# Patient Record
Sex: FEMALE | Race: WHITE | Employment: PART TIME | URBAN - METROPOLITAN AREA
[De-identification: names, ages, dates, MRNs, and addresses within clinical notes are randomized per-mention and may not be internally consistent; named-entity substitution may affect disease eponyms.]

---

## 2017-11-08 ENCOUNTER — HOSPITAL ENCOUNTER (EMERGENCY)
Age: 47
Discharge: HOME | End: 2017-11-08
Payer: COMMERCIAL

## 2017-11-08 VITALS
TEMPERATURE: 98 F | HEIGHT: 64 IN | WEIGHT: 168 LBS | BODY MASS INDEX: 28.68 KG/M2 | OXYGEN SATURATION: 100 % | DIASTOLIC BLOOD PRESSURE: 68 MMHG | HEART RATE: 72 BPM | RESPIRATION RATE: 18 BRPM | SYSTOLIC BLOOD PRESSURE: 132 MMHG

## 2017-11-08 DIAGNOSIS — S16.1XXA STRAIN OF NECK MUSCLE, INITIAL ENCOUNTER: Primary | ICD-10-CM

## 2017-11-08 PROCEDURE — 96372 THER/PROPH/DIAG INJ SC/IM: CPT

## 2017-11-08 PROCEDURE — 6360000002 HC RX W HCPCS: Performed by: NURSE PRACTITIONER

## 2017-11-08 PROCEDURE — 96374 THER/PROPH/DIAG INJ IV PUSH: CPT

## 2017-11-08 PROCEDURE — 99283 EMERGENCY DEPT VISIT LOW MDM: CPT

## 2017-11-08 RX ORDER — IBUPROFEN 600 MG/1
600 TABLET ORAL EVERY 8 HOURS PRN
Qty: 20 TABLET | Refills: 0 | Status: SHIPPED | OUTPATIENT
Start: 2017-11-08 | End: 2017-11-08

## 2017-11-08 RX ORDER — IBUPROFEN 800 MG/1
800 TABLET ORAL EVERY 6 HOURS PRN
COMMUNITY
End: 2019-08-04 | Stop reason: SDUPTHER

## 2017-11-08 RX ORDER — CYCLOBENZAPRINE HCL 10 MG
10 TABLET ORAL 3 TIMES DAILY PRN
Qty: 15 TABLET | Refills: 0 | Status: SHIPPED | OUTPATIENT
Start: 2017-11-08 | End: 2017-11-13

## 2017-11-08 RX ORDER — KETOROLAC TROMETHAMINE 30 MG/ML
30 INJECTION, SOLUTION INTRAMUSCULAR; INTRAVENOUS ONCE
Status: COMPLETED | OUTPATIENT
Start: 2017-11-08 | End: 2017-11-08

## 2017-11-08 RX ORDER — ORPHENADRINE CITRATE 30 MG/ML
60 INJECTION INTRAMUSCULAR; INTRAVENOUS ONCE
Status: COMPLETED | OUTPATIENT
Start: 2017-11-08 | End: 2017-11-08

## 2017-11-08 RX ORDER — METHOCARBAMOL 750 MG/1
750 TABLET, FILM COATED ORAL 3 TIMES DAILY PRN
Qty: 30 TABLET | Refills: 0 | Status: SHIPPED | OUTPATIENT
Start: 2017-11-08 | End: 2017-11-08

## 2017-11-08 RX ORDER — IBUPROFEN 600 MG/1
600 TABLET ORAL EVERY 8 HOURS PRN
Qty: 20 TABLET | Refills: 0 | Status: SHIPPED | OUTPATIENT
Start: 2017-11-08 | End: 2019-08-04

## 2017-11-08 RX ADMIN — ORPHENADRINE CITRATE 60 MG: 30 INJECTION INTRAMUSCULAR; INTRAVENOUS at 19:48

## 2017-11-08 RX ADMIN — KETOROLAC TROMETHAMINE 30 MG: 30 INJECTION, SOLUTION INTRAMUSCULAR at 19:48

## 2017-11-08 ASSESSMENT — PAIN SCALES - GENERAL
PAINLEVEL_OUTOF10: 10
PAINLEVEL_OUTOF10: 10

## 2017-11-08 ASSESSMENT — PAIN DESCRIPTION - PAIN TYPE: TYPE: ACUTE PAIN

## 2017-11-08 ASSESSMENT — PAIN DESCRIPTION - DESCRIPTORS: DESCRIPTORS: STABBING;SHARP;ACHING;THROBBING

## 2017-11-08 ASSESSMENT — PAIN DESCRIPTION - ORIENTATION: ORIENTATION: LEFT

## 2017-11-08 ASSESSMENT — PAIN DESCRIPTION - LOCATION: LOCATION: HEAD;NECK

## 2017-11-09 NOTE — ED TRIAGE NOTES
Pt states she coughed hard yesterday and it felt like her head exploded and dislocated from her neck. States since that moment she has had neck pain that goes into her left shoulder and is now getting a headache and pain behind her eyes. Denies n/v, states she does have tendonitis, a/ox3. Pt is looking for any relief from the pain.

## 2017-11-09 NOTE — ED PROVIDER NOTES
[address] [place] [address] ED  eMERGENCY dEPARTMENT eNCOUnter      Pt Name: Sindi Jones  MRN: [id number]  [place] 1970  Date of evaluation: 2017  Provider: Henrique Choudhary NP    [address] [place] [address]       Chief Complaint   Patient presents with    Headache     w/ lt neck pain since she coughed 2 hrs ago    Neck Pain         HISTORY OF PRESENT ILLNESS  (Location/Symptom, Timing/Onset, Context/Setting, Quality, Duration, Modifying Factors, Severity.)   Sindi Jones is a 50 y.o. female who presents to the emergency department By private auto for evaluation of left-sided neck pain that radiates up the back of her head that occurred after patient coughed hard 2 hours prior to arrival.  Pain is worse when she turns her head to the left side. She says her pain as a sharp sensation. Her pain as a 10 out of 10. She denies dizziness or visual changes. No numbness or tingling in her extremities. Nursing Notes were reviewed. PAST MEDICAL HISTORY     Past Medical History:   Diagnosis Date    Allergic rhinitis     Asthma     Chronic back pain     Fibromyalgia     Osteoarthritis          SURGICAL HISTORY       Past Surgical History:   Procedure Laterality Date     SECTION      DILATION AND CURETTAGE OF UTERUS           CURRENT MEDICATIONS       Discharge Medication List as of 2017  7:47 PM      CONTINUE these medications which have NOT CHANGED    Details   !! ibuprofen (ADVIL;MOTRIN) 800 MG tablet Take 800 mg by mouth every 6 hours as needed for PainHistorical Med      acetaminophen-codeine (TYLENOL/CODEINE #3) 300-30 MG per tablet Take 1 tablet by mouth every 4 hours as needed for Pain, Disp-20 tablet, R-0       !! - Potential duplicate medications found. Please discuss with provider. ALLERGIES     Review of patient's allergies indicates no known allergies. FAMILY HISTORY     History reviewed. No pertinent family history.        SOCIAL HISTORY       Social History     Social History    Marital status: Single     Spouse name: N/A    Number of children: N/A    Years of education: N/A     Social History Main Topics    Smoking status: Current Every Day Smoker     Packs/day: 0.50     Years: 15.00     Types: Cigarettes    Smokeless tobacco: Never Used    Alcohol use No    Drug use:       Comment: Edgar  2 x week    Sexual activity: Not Currently     Other Topics Concern    None     Social History Narrative    None         REVIEW OF SYSTEMS    (2-9 systems for level 4, 10 or more for level 5)     Review of Systems   Musculoskeletal: Positive for neck pain. Neurological: Positive for headaches. All other systems reviewed and are negative. Except as noted above the remainder of the review of systems was reviewed and negative. PHYSICAL EXAM    (up to 7 for level 4, 8 or more for level 5)     ED Triage Vitals [11/08/17 1939]   BP Temp Temp Source Pulse Resp SpO2 Height Weight   132/68 98 °F (36.7 °C) Oral 72 18 100 % 5' 4\" (1.626 m) 168 lb (76.2 kg)       Physical Exam   Constitutional: She is oriented to person, place, and time. She appears well-developed and well-nourished. HENT:   Head: Normocephalic and atraumatic. Right Ear: External ear normal.   Left Ear: External ear normal.   Nose: Nose normal.   Mouth/Throat: Oropharynx is clear and moist.   Eyes: Conjunctivae and EOM are normal. Pupils are equal, round, and reactive to light. Neck: Trachea normal. Neck supple. Muscular tenderness present. No spinous process tenderness present. No neck rigidity. No edema and no erythema present. Cardiovascular: Normal rate, regular rhythm and normal heart sounds. Pulmonary/Chest: Effort normal and breath sounds normal.   Musculoskeletal: Normal range of motion. Neurological: She is alert and oriented to person, place, and time. She has normal strength and normal reflexes. No cranial nerve deficit or sensory deficit. Skin: Skin is warm and dry. No erythema.    Psychiatric:

## 2018-08-07 ENCOUNTER — HOSPITAL ENCOUNTER (EMERGENCY)
Age: 48
Discharge: HOME | End: 2018-08-07
Attending: EMERGENCY MEDICINE
Payer: COMMERCIAL

## 2018-08-07 ENCOUNTER — APPOINTMENT (OUTPATIENT)
Dept: GENERAL RADIOLOGY | Age: 48
End: 2018-08-07
Payer: COMMERCIAL

## 2018-08-07 VITALS
HEIGHT: 66 IN | TEMPERATURE: 97.6 F | OXYGEN SATURATION: 99 % | WEIGHT: 197 LBS | HEART RATE: 74 BPM | BODY MASS INDEX: 31.66 KG/M2 | DIASTOLIC BLOOD PRESSURE: 70 MMHG | SYSTOLIC BLOOD PRESSURE: 120 MMHG | RESPIRATION RATE: 20 BRPM

## 2018-08-07 DIAGNOSIS — S39.012A STRAIN OF LUMBAR REGION, INITIAL ENCOUNTER: ICD-10-CM

## 2018-08-07 DIAGNOSIS — M62.838 SPASM OF MUSCLE: Primary | ICD-10-CM

## 2018-08-07 PROCEDURE — 6360000002 HC RX W HCPCS: Performed by: EMERGENCY MEDICINE

## 2018-08-07 PROCEDURE — 6370000000 HC RX 637 (ALT 250 FOR IP): Performed by: EMERGENCY MEDICINE

## 2018-08-07 PROCEDURE — 72110 X-RAY EXAM L-2 SPINE 4/>VWS: CPT

## 2018-08-07 PROCEDURE — 99283 EMERGENCY DEPT VISIT LOW MDM: CPT

## 2018-08-07 RX ORDER — KETOROLAC TROMETHAMINE 30 MG/ML
60 INJECTION, SOLUTION INTRAMUSCULAR; INTRAVENOUS ONCE
Status: COMPLETED | OUTPATIENT
Start: 2018-08-07 | End: 2018-08-07

## 2018-08-07 RX ORDER — METAXALONE 800 MG/1
800 TABLET ORAL 3 TIMES DAILY
Status: DISCONTINUED | OUTPATIENT
Start: 2018-08-07 | End: 2018-08-07 | Stop reason: HOSPADM

## 2018-08-07 RX ORDER — LIDOCAINE 50 MG/G
1 PATCH TOPICAL DAILY
Qty: 30 PATCH | Refills: 0 | Status: SHIPPED | OUTPATIENT
Start: 2018-08-07 | End: 2019-08-04

## 2018-08-07 RX ORDER — IBUPROFEN 800 MG/1
800 TABLET ORAL EVERY 8 HOURS PRN
Qty: 30 TABLET | Refills: 0 | Status: SHIPPED | OUTPATIENT
Start: 2018-08-07 | End: 2019-10-01 | Stop reason: ALTCHOICE

## 2018-08-07 RX ORDER — OXYCODONE HYDROCHLORIDE AND ACETAMINOPHEN 5; 325 MG/1; MG/1
1-2 TABLET ORAL EVERY 6 HOURS PRN
Qty: 20 TABLET | Refills: 0 | Status: SHIPPED | OUTPATIENT
Start: 2018-08-07 | End: 2018-08-12

## 2018-08-07 RX ORDER — OXYCODONE HYDROCHLORIDE AND ACETAMINOPHEN 5; 325 MG/1; MG/1
1 TABLET ORAL ONCE
Status: COMPLETED | OUTPATIENT
Start: 2018-08-07 | End: 2018-08-07

## 2018-08-07 RX ORDER — METHOCARBAMOL 750 MG/1
750 TABLET, FILM COATED ORAL 4 TIMES DAILY
Qty: 40 TABLET | Refills: 0 | Status: SHIPPED | OUTPATIENT
Start: 2018-08-07 | End: 2018-08-10

## 2018-08-07 RX ADMIN — OXYCODONE HYDROCHLORIDE AND ACETAMINOPHEN 1 TABLET: 5; 325 TABLET ORAL at 07:04

## 2018-08-07 RX ADMIN — KETOROLAC TROMETHAMINE 60 MG: 30 INJECTION, SOLUTION INTRAMUSCULAR at 07:04

## 2018-08-07 ASSESSMENT — PAIN DESCRIPTION - FREQUENCY: FREQUENCY: CONTINUOUS

## 2018-08-07 ASSESSMENT — PAIN DESCRIPTION - ORIENTATION: ORIENTATION: LOWER

## 2018-08-07 ASSESSMENT — PAIN DESCRIPTION - LOCATION: LOCATION: BACK

## 2018-08-07 ASSESSMENT — PAIN DESCRIPTION - DESCRIPTORS: DESCRIPTORS: TIGHTNESS

## 2018-08-07 ASSESSMENT — PAIN DESCRIPTION - ONSET: ONSET: ON-GOING

## 2018-08-07 ASSESSMENT — PAIN DESCRIPTION - PAIN TYPE: TYPE: ACUTE PAIN

## 2018-08-07 ASSESSMENT — PAIN SCALES - GENERAL
PAINLEVEL_OUTOF10: 9
PAINLEVEL_OUTOF10: 9

## 2018-08-07 ASSESSMENT — PAIN DESCRIPTION - PROGRESSION: CLINICAL_PROGRESSION: NOT CHANGED

## 2018-08-07 NOTE — ED PROVIDER NOTES
smoking Cigarettes. She has a 7.50 pack-year smoking history. She has never used smokeless tobacco. She reports that she uses drugs. She reports that she does not drink alcohol. REVIEW OF SYSTEMS    (2-9 systems for level 4, 10 or more for level 5)     Review of Systems   All other systems reviewed and are negative. Except as noted above the remainder of the review of systems was reviewed and negative. PHYSICAL EXAM    (up to 7 for level 4, 8 or more for level 5)     Vitals:    08/07/18 0531   BP: 120/70   Pulse: 74   Resp: 20   Temp: 97.6 °F (36.4 °C)   TempSrc: Oral   SpO2: 99%   Weight: 197 lb (89.4 kg)   Height: 5' 6\" (1.676 m)       Physical exam reflects an uncomfortable female. She is afebrile with stable vital signs to include pulse ox of 99% on room air. She is not hypoxic. She is alert conversive and appropriate in behavior. Her movements are antalgic. She has diffuse paraspinal spasm to the thoracolumbar distribution. She is tender on palpation of the low lumbar spine area in the midline. Overlying integument without rash or lesion. Her regular rate and rhythm normal S1 and S2 no murmurs rubs gallops. Lungs are clear to auscultation without wheezes rales or rhonchi. No CVA discomfort abdomen is soft throughout no focal pain rebound or guarding. Lower extremities show no loss of sensation to any dermatome. Deep tendon reflexes present 2+. She has no acute neurovascular deficits.       DIAGNOSTIC RESULTS       RADIOLOGY:   Non-plain film images such as CT, Ultrasound and MRI are read by the radiologist. Plain radiographic images are visualized and preliminarily interpreted by the emergency physician with the below findings:    XR LUMBAR SPINE (MIN 4 VIEWS)   Status: Final result   Order Providers     Authorizing Billing   MD James Balderas MD          Signed by     Signed Date/Time  Phone Pager   Vic Neil 8/07/2018 08:42 [phone]    Reading Radiologists Read Date Phone Pager   Justin Webber Aug 7, 2018 [phone]    Routing History     Priority Sent On From To Message Type    8/7/2018 10:34 AM [facility], [facility] Incoming Radiant Results From [facility] F/U Results   Radiation Dose Estimates     No radiation information found for this patient   Narrative   EXAMINATION:   5 XRAY VIEWS OF THE LUMBAR SPINE       8/7/2018 6:45 am       COMPARISON:   06/18/2010, CT abdomen pelvis from 02/08/2016       HISTORY:   ORDERING SYSTEM PROVIDED HISTORY: pain   TECHNOLOGIST PROVIDED HISTORY:   Reason for exam:->pain   Ordering Physician Provided Reason for Exam: low back pain   Acuity: Acute   Type of Exam: Initial       59-year-old female with acute lower back pain       FINDINGS:   Pedicles symmetric in appearance.  Bilateral SI joints appear patent. Visualize sacral arcuate lines appear intact.  Pelvic phleboliths.  Moderate   stool burden.  Psoas shadows symmetric in appearance.       Lumbar spine imaged from mid T10 vertebral body level to the lower coccyx on   the lateral views.  Gross preservation of the vertebral body heights.  5 mm   retrolisthesis of L4 on L5.  4 mm retrolisthesis of L3 on L4.  Findings are   slightly extension weighted when compared with 06/18/2010.  Mild narrowing of   the L5-S1 intervertebral disc space.  Mild multilevel hypertrophic osteophyte   spur formation.  Atherosclerotic calcification of the abdominal aorta and   iliac bifurcation.  Mild facet arthrosis at the lumbosacral junction.  No   pars defects.  3 mm anterolisthesis of L5 on S1.           Impression   1. Mild multilevel degenerative changes within the lumbar spine. 2. No clear evidence for acute fracture within the lumbar spine. 3. 5 mm retrolisthesis of L4 on L5.  4 mm retrolisthesis of L3 on L4.  Mild   disc space narrowing at L5-S1.  3 mm anterolisthesis of L5 on S1 without   definite pars defects.  Findings may be related to facet arthrosis.      Documentation No signs of potential drug abuse or diversion identified.        (Please note that portions of this note were completed with a voice recognition program.  Efforts were made to edit the dictations but occasionally words are mis-transcribed.)    Consuelo Dawson MD  Attending Emergency Physician         Consuelo Dawson MD  08/07/18 5103

## 2018-08-07 NOTE — ED NOTES
To room per [facility] lower back pain since Sunday after sleeping on couch denies injury no radiation denies bladder or bowel problems taking advil without relief.       Lisset Allan RN  08/07/18 5095

## 2018-08-10 ENCOUNTER — HOSPITAL ENCOUNTER (EMERGENCY)
Age: 48
Discharge: HOME | End: 2018-08-10
Attending: EMERGENCY MEDICINE
Payer: COMMERCIAL

## 2018-08-10 VITALS
TEMPERATURE: 97.7 F | RESPIRATION RATE: 18 BRPM | HEIGHT: 65 IN | OXYGEN SATURATION: 97 % | DIASTOLIC BLOOD PRESSURE: 69 MMHG | SYSTOLIC BLOOD PRESSURE: 108 MMHG | WEIGHT: 197 LBS | BODY MASS INDEX: 32.82 KG/M2 | HEART RATE: 79 BPM

## 2018-08-10 DIAGNOSIS — S39.012D STRAIN OF LUMBAR REGION, SUBSEQUENT ENCOUNTER: Primary | ICD-10-CM

## 2018-08-10 PROCEDURE — 99283 EMERGENCY DEPT VISIT LOW MDM: CPT

## 2018-08-10 PROCEDURE — 96372 THER/PROPH/DIAG INJ SC/IM: CPT

## 2018-08-10 PROCEDURE — 81003 URINALYSIS AUTO W/O SCOPE: CPT

## 2018-08-10 PROCEDURE — 6360000002 HC RX W HCPCS: Performed by: EMERGENCY MEDICINE

## 2018-08-10 RX ORDER — CYCLOBENZAPRINE HCL 10 MG
10 TABLET ORAL 3 TIMES DAILY PRN
Qty: 20 TABLET | Refills: 0 | Status: SHIPPED | OUTPATIENT
Start: 2018-08-10 | End: 2019-10-01 | Stop reason: ALTCHOICE

## 2018-08-10 RX ORDER — PROMETHAZINE HYDROCHLORIDE 25 MG/ML
25 INJECTION, SOLUTION INTRAMUSCULAR; INTRAVENOUS ONCE
Status: COMPLETED | OUTPATIENT
Start: 2018-08-10 | End: 2018-08-10

## 2018-08-10 RX ORDER — OXYCODONE HYDROCHLORIDE AND ACETAMINOPHEN 5; 325 MG/1; MG/1
1 TABLET ORAL EVERY 6 HOURS PRN
Qty: 20 TABLET | Refills: 0 | Status: SHIPPED | OUTPATIENT
Start: 2018-08-10 | End: 2018-08-30

## 2018-08-10 RX ORDER — CYCLOBENZAPRINE HCL 10 MG
10 TABLET ORAL 3 TIMES DAILY PRN
COMMUNITY
End: 2019-08-04

## 2018-08-10 RX ADMIN — PROMETHAZINE HYDROCHLORIDE 25 MG: 25 INJECTION, SOLUTION INTRAMUSCULAR; INTRAVENOUS at 15:50

## 2018-08-10 RX ADMIN — HYDROMORPHONE HYDROCHLORIDE 1.5 MG: 1 INJECTION, SOLUTION INTRAMUSCULAR; INTRAVENOUS; SUBCUTANEOUS at 15:50

## 2018-08-10 ASSESSMENT — PAIN DESCRIPTION - LOCATION: LOCATION: BACK

## 2018-08-10 ASSESSMENT — PAIN DESCRIPTION - PAIN TYPE: TYPE: ACUTE PAIN

## 2018-08-10 ASSESSMENT — PAIN SCALES - GENERAL
PAINLEVEL_OUTOF10: 8
PAINLEVEL_OUTOF10: 10

## 2018-08-10 ASSESSMENT — PAIN DESCRIPTION - ORIENTATION: ORIENTATION: LOWER;MID

## 2018-08-10 ASSESSMENT — PAIN DESCRIPTION - DESCRIPTORS: DESCRIPTORS: SHARP;SHOOTING;STABBING

## 2018-08-10 NOTE — ED PROVIDER NOTES
[address] [place] [address] ED  eMERGENCY dEPARTMENT eNCOUnter      Pt Name: Christy Kat  MRN: [id number]  [place] 1970  Date of evaluation: 8/10/2018  Provider: Melanie Lunsford MD    [address] [place] [address]       Chief Complaint   Patient presents with    Back Pain     past 5 days (tx here 3 days ago)         HISTORY OF PRESENT ILLNESS   (Location/Symptom, Timing/Onset, Context/Setting, Quality, Duration, Modifying Factors, Severity)  Note limiting factors. Christy Kat is a 46 y.o. female who presents to the emergency department With a chief complaint of low back pain with which she woke up 5 days ago. She was evaluated in this emergency department 3 days ago and was placed on Percocet and a muscle relaxant. Patient states that this morning when she woke up the pain was going up the back and feels like a lot of tightness and cramping. She denies weakness of the legs or loss of bladder control. The history is provided by the patient and medical records. Nursing Notes were reviewed. REVIEW OF SYSTEMS    (2-9 systems for level 4, 10 or more for level 5)     Review of Systems   All other systems reviewed and are negative. Except as noted above the remainder of the review of systems was reviewed and negative.        PAST MEDICAL HISTORY     Past Medical History:   Diagnosis Date    Allergic rhinitis     Asthma     Chronic back pain     on 8/10/18 pt denies pain mgmnt    Fibromyalgia     Osteoarthritis          SURGICAL HISTORY       Past Surgical History:   Procedure Laterality Date     SECTION      DILATION AND CURETTAGE OF UTERUS           CURRENT MEDICATIONS       Previous Medications    ACETAMINOPHEN-CODEINE (TYLENOL/CODEINE #3) 300-30 MG PER TABLET    Take 1 tablet by mouth every 4 hours as needed for Pain    CYCLOBENZAPRINE (FLEXERIL) 10 MG TABLET    Take 10 mg by mouth 3 times daily as needed for Muscle spasms    IBUPROFEN (ADVIL;MOTRIN) 600 MG TABLET    Take 1 tablet by mouth every 8 tone. Coordination normal.   Skin: Skin is warm and dry. No pallor. Nursing note and vitals reviewed. DIAGNOSTIC RESULTS     EKG: All EKG's are interpreted by the Emergency Department Physician who either signs or Co-signs this chart in the absence of a cardiologist.    RADIOLOGY:   Non-plain film images such as CT, Ultrasound and MRI are read by the radiologist. Plain radiographic images are visualized and preliminarily interpreted by the emergency physician with the below findings:    Interpretation per the Radiologist below, if available at the time of this note:    No orders to display         ED BEDSIDE ULTRASOUND:   Performed by ED Physician - none    LABS:  Labs Reviewed - No data to display    All other labs were within normal range or not returned as of this dictation. EMERGENCY DEPARTMENT COURSE and DIFFERENTIAL DIAGNOSIS/MDM:   Vitals:    Vitals:    08/10/18 1508   BP: 108/69   Pulse: 79   Resp: 18   Temp: 97.7 °F (36.5 °C)   TempSrc: Oral   SpO2: 97%   Weight: 197 lb (89.4 kg)   Height: 5' 5\" (1.651 m)       Patient was treated with Dilaudid and Phenergan IM in the ED and is starting to feel relief of symptoms. She is discharged with another prescription for Percocet and Flexeril and is given a slip to be off work the next couple days. MDM    CONSULTS:  None    PROCEDURES:  Unless otherwise noted below, none     Procedures    FINAL IMPRESSION      1.  Strain of lumbar region, subsequent encounter          DISPOSITION/PLAN   DISPOSITION Decision To Discharge 08/10/2018 04:43:01 PM      PATIENT REFERRED TO:  Kendall Ruffin MD  [address] [place] [address]  [phone]    Schedule an appointment as soon as possible for a visit         DISCHARGE MEDICATIONS:  New Prescriptions    CYCLOBENZAPRINE (FLEXERIL) 10 MG TABLET    Take 1 tablet by mouth 3 times daily as needed for Muscle spasms    OXYCODONE-ACETAMINOPHEN (PERCOCET) 5-325 MG PER TABLET    Take 1 tablet by mouth every 6 hours as needed for Pain for up to 20 days. .          Problem List:  Patient Active Problem List   Diagnosis Code    Back pain, chronic M54.9, G89.29    Neck pain M54.2    Asthma J45.909    Family history of breast cancer Z80.3    Lingual tonsil hypertrophy J35.1    Abnormal CT scan, neck R93.8    Smoker F17.200           Summation      Patient Course:  Stable. ED Medications administered this visit:    Medications   HYDROmorphone (DILAUDID) injection 1.5 mg (1.5 mg Intramuscular Given 8/10/18 1550)   promethazine (PHENERGAN) injection 25 mg (25 mg Intramuscular Given 8/10/18 1550)       New Prescriptions from this visit:    New Prescriptions    CYCLOBENZAPRINE (FLEXERIL) 10 MG TABLET    Take 1 tablet by mouth 3 times daily as needed for Muscle spasms    OXYCODONE-ACETAMINOPHEN (PERCOCET) 5-325 MG PER TABLET    Take 1 tablet by mouth every 6 hours as needed for Pain for up to 20 days. .       Follow-up:  Satish Malcolm MD  [address]  [phone]    Schedule an appointment as soon as possible for a visit           Final Impression:   1.  Strain of lumbar region, subsequent encounter               (Please note that portions of this note were completed with a voice recognition program.  Efforts were made to edit the dictations but occasionally words are mis-transcribed.)    Anita Aaron MD (electronically signed)  Attending Emergency Physician            Anita Aaron MD  08/10/18 3224

## 2018-11-01 ENCOUNTER — OFFICE VISIT (OUTPATIENT)
Dept: FAMILY MEDICINE CLINIC | Age: 48
End: 2018-11-01
Payer: COMMERCIAL

## 2018-11-01 ENCOUNTER — HOSPITAL ENCOUNTER (OUTPATIENT)
Age: 48
Setting detail: SPECIMEN
Discharge: HOME | End: 2018-11-01
Payer: COMMERCIAL

## 2018-11-01 VITALS
HEIGHT: 65 IN | SYSTOLIC BLOOD PRESSURE: 132 MMHG | TEMPERATURE: 97.6 F | WEIGHT: 196 LBS | BODY MASS INDEX: 32.65 KG/M2 | DIASTOLIC BLOOD PRESSURE: 83 MMHG | HEART RATE: 68 BPM

## 2018-11-01 DIAGNOSIS — Z23 NEED FOR PNEUMOCOCCAL VACCINATION: ICD-10-CM

## 2018-11-01 DIAGNOSIS — Z23 NEED FOR INFLUENZA VACCINATION: ICD-10-CM

## 2018-11-01 DIAGNOSIS — R63.5 WEIGHT GAIN: ICD-10-CM

## 2018-11-01 DIAGNOSIS — R53.83 FATIGUE, UNSPECIFIED TYPE: ICD-10-CM

## 2018-11-01 DIAGNOSIS — Z23 NEED FOR TETANUS BOOSTER: ICD-10-CM

## 2018-11-01 DIAGNOSIS — Z00.00 HEALTHCARE MAINTENANCE: ICD-10-CM

## 2018-11-01 DIAGNOSIS — M54.2 NECK PAIN: Primary | ICD-10-CM

## 2018-11-01 LAB
ABSOLUTE EOS #: 0.14 K/UL (ref 0–0.44)
ABSOLUTE IMMATURE GRANULOCYTE: <0.03 K/UL (ref 0–0.3)
ABSOLUTE LYMPH #: 2.43 K/UL (ref 1.1–3.7)
ABSOLUTE MONO #: 0.62 K/UL (ref 0.1–1.2)
ALBUMIN SERPL-MCNC: 4.4 G/DL (ref 3.5–5.2)
ALBUMIN/GLOBULIN RATIO: 1.6 (ref 1–2.5)
ALP BLD-CCNC: 56 U/L (ref 35–104)
ALT SERPL-CCNC: 11 U/L (ref 5–33)
ANION GAP SERPL CALCULATED.3IONS-SCNC: 18 MMOL/L (ref 9–17)
AST SERPL-CCNC: 17 U/L
BASOPHILS # BLD: 1 % (ref 0–2)
BASOPHILS ABSOLUTE: 0.06 K/UL (ref 0–0.2)
BILIRUB SERPL-MCNC: 0.17 MG/DL (ref 0.3–1.2)
BUN BLDV-MCNC: 13 MG/DL (ref 6–20)
BUN/CREAT BLD: ABNORMAL (ref 9–20)
CALCIUM SERPL-MCNC: 9.2 MG/DL (ref 8.6–10.4)
CHLORIDE BLD-SCNC: 102 MMOL/L (ref 98–107)
CO2: 24 MMOL/L (ref 20–31)
CREAT SERPL-MCNC: 0.71 MG/DL (ref 0.5–0.9)
DIFFERENTIAL TYPE: ABNORMAL
EOSINOPHILS RELATIVE PERCENT: 2 % (ref 1–4)
GFR AFRICAN AMERICAN: >60 ML/MIN
GFR NON-AFRICAN AMERICAN: >60 ML/MIN
GFR SERPL CREATININE-BSD FRML MDRD: ABNORMAL ML/MIN/{1.73_M2}
GFR SERPL CREATININE-BSD FRML MDRD: ABNORMAL ML/MIN/{1.73_M2}
GLUCOSE BLD-MCNC: 91 MG/DL (ref 70–99)
HCT VFR BLD CALC: 42 % (ref 36.3–47.1)
HEMOGLOBIN: 13 G/DL (ref 11.9–15.1)
IMMATURE GRANULOCYTES: 0 %
LYMPHOCYTES # BLD: 35 % (ref 24–43)
MCH RBC QN AUTO: 28.6 PG (ref 25.2–33.5)
MCHC RBC AUTO-ENTMCNC: 31 G/DL (ref 28.4–34.8)
MCV RBC AUTO: 92.3 FL (ref 82.6–102.9)
MONOCYTES # BLD: 9 % (ref 3–12)
NRBC AUTOMATED: 0 PER 100 WBC
PDW BLD-RTO: 16.2 % (ref 11.8–14.4)
PLATELET # BLD: 298 K/UL (ref 138–453)
PLATELET ESTIMATE: ABNORMAL
PMV BLD AUTO: 9.4 FL (ref 8.1–13.5)
POTASSIUM SERPL-SCNC: 3.7 MMOL/L (ref 3.7–5.3)
RBC # BLD: 4.55 M/UL (ref 3.95–5.11)
RBC # BLD: ABNORMAL 10*6/UL
SEG NEUTROPHILS: 53 % (ref 36–65)
SEGMENTED NEUTROPHILS ABSOLUTE COUNT: 3.7 K/UL (ref 1.5–8.1)
SODIUM BLD-SCNC: 144 MMOL/L (ref 135–144)
TOTAL PROTEIN: 7.1 G/DL (ref 6.4–8.3)
TSH SERPL DL<=0.05 MIU/L-ACNC: 2.46 MIU/L (ref 0.3–5)
WBC # BLD: 7 K/UL (ref 3.5–11.3)
WBC # BLD: ABNORMAL 10*3/UL

## 2018-11-01 PROCEDURE — G8417 CALC BMI ABV UP PARAM F/U: HCPCS | Performed by: FAMILY MEDICINE

## 2018-11-01 PROCEDURE — G8482 FLU IMMUNIZE ORDER/ADMIN: HCPCS | Performed by: FAMILY MEDICINE

## 2018-11-01 PROCEDURE — 99214 OFFICE O/P EST MOD 30 MIN: CPT | Performed by: FAMILY MEDICINE

## 2018-11-01 PROCEDURE — 90715 TDAP VACCINE 7 YRS/> IM: CPT | Performed by: FAMILY MEDICINE

## 2018-11-01 PROCEDURE — G0008 ADMIN INFLUENZA VIRUS VAC: HCPCS | Performed by: FAMILY MEDICINE

## 2018-11-01 PROCEDURE — 4004F PT TOBACCO SCREEN RCVD TLK: CPT | Performed by: FAMILY MEDICINE

## 2018-11-01 PROCEDURE — G0009 ADMIN PNEUMOCOCCAL VACCINE: HCPCS | Performed by: FAMILY MEDICINE

## 2018-11-01 PROCEDURE — 3017F COLORECTAL CA SCREEN DOC REV: CPT | Performed by: FAMILY MEDICINE

## 2018-11-01 PROCEDURE — G8427 DOCREV CUR MEDS BY ELIG CLIN: HCPCS | Performed by: FAMILY MEDICINE

## 2018-11-01 ASSESSMENT — PATIENT HEALTH QUESTIONNAIRE - PHQ9
1. LITTLE INTEREST OR PLEASURE IN DOING THINGS: 0
2. FEELING DOWN, DEPRESSED OR HOPELESS: 0
SUM OF ALL RESPONSES TO PHQ QUESTIONS 1-9: 0
SUM OF ALL RESPONSES TO PHQ QUESTIONS 1-9: 0
SUM OF ALL RESPONSES TO PHQ9 QUESTIONS 1 & 2: 0

## 2018-11-01 NOTE — PATIENT INSTRUCTIONS
Thank you for letting us take care of you today. We hope all your questions were addressed. If a question was overlooked or something else comes to mind after you return home, please contact a member of your Care Team listed below. Please make sure you have a routine office visit set up to follow-up on [address]. Your Care Team at [place] [address] is Team #4  Mamadou Goldberg MD (Faculty)  MD Myra Ruggiero MD (Resident)  Henry Rashid MD (Resident)  Jp Mckee MD (Resident)  Torin Adam MD (Resident)  Aretha Severance, MD (Resident)  GERONIMO Cantu., IVETH Rdz, IVETH Meier ([facility])  Shirlene Aguilera RN, ([address] [facility])  Venkat Reich, Ph.D., (Behavioral Services)  Tamra Klinefelter, [facility] (Clinical Pharmacist)       Office phone number: [phone]    If you need to get in right away due to illness, please be advised we have \"Same Day\" appointments available Monday-Friday. Please call us at [phone] option #3 to schedule your \"Same Day\" appointment. Patient Education        Stopping Smoking: Care Instructions  Your Care Instructions  Cigarette smokers crave the nicotine in cigarettes. Giving it up is much harder than simply changing a habit. Your body has to stop craving the nicotine. It is hard to quit, but you can do it. There are many tools that people use to quit smoking. You may find that combining tools works best for you. There are several steps to quitting. First you get ready to quit. Then you get support to help you. After that, you learn new skills and behaviors to become a nonsmoker. For many people, a necessary step is getting and using medicine. Your doctor will help you set up the plan that best meets your needs. You may want to attend a smoking cessation program to help you quit smoking. When you choose a program, look for one that has proven success. Ask your doctor for ideas.  You will greatly treatment and safety. Be sure to make and go to all appointments, and call your doctor if you are having problems. It's also a good idea to know your test results and keep a list of the medicines you take. Where can you learn more? Go to [email or web address] org and sign in to your [employer] account. Enter Q771 in the [employer]book[employer] box to learn more about \"Low Back Arthritis: Exercises. \"     If you do not have an account, please click on the \"Sign Up Now\" link. Current as of: November 29, 2017  Content Version: 11.7  © 2472-8492 [employer]. Care instructions adapted under license by [facility] ([facility]). If you have questions about a medical condition or this instruction, always ask your healthcare professional. [address] any warranty or liability for your use of this information.

## 2018-11-03 ENCOUNTER — HOSPITAL ENCOUNTER (OUTPATIENT)
Dept: MAMMOGRAPHY | Age: 48
Discharge: HOME | End: 2018-11-05
Payer: COMMERCIAL

## 2018-11-03 DIAGNOSIS — Z00.00 HEALTHCARE MAINTENANCE: ICD-10-CM

## 2018-11-03 PROCEDURE — 77063 BREAST TOMOSYNTHESIS BI: CPT

## 2019-01-03 ENCOUNTER — OFFICE VISIT (OUTPATIENT)
Dept: FAMILY MEDICINE CLINIC | Age: 49
End: 2019-01-03
Payer: COMMERCIAL

## 2019-01-03 VITALS
HEART RATE: 57 BPM | TEMPERATURE: 96.9 F | BODY MASS INDEX: 31.96 KG/M2 | WEIGHT: 191.8 LBS | HEIGHT: 65 IN | SYSTOLIC BLOOD PRESSURE: 93 MMHG | DIASTOLIC BLOOD PRESSURE: 60 MMHG

## 2019-01-03 DIAGNOSIS — M25.512 CHRONIC PAIN OF BOTH SHOULDERS: Primary | ICD-10-CM

## 2019-01-03 DIAGNOSIS — G89.29 CHRONIC PAIN OF BOTH SHOULDERS: Primary | ICD-10-CM

## 2019-01-03 DIAGNOSIS — N92.1 METRORRHAGIA: ICD-10-CM

## 2019-01-03 DIAGNOSIS — M25.511 CHRONIC PAIN OF BOTH SHOULDERS: Primary | ICD-10-CM

## 2019-01-03 PROCEDURE — G8427 DOCREV CUR MEDS BY ELIG CLIN: HCPCS | Performed by: FAMILY MEDICINE

## 2019-01-03 PROCEDURE — G8417 CALC BMI ABV UP PARAM F/U: HCPCS | Performed by: FAMILY MEDICINE

## 2019-01-03 PROCEDURE — 3017F COLORECTAL CA SCREEN DOC REV: CPT | Performed by: FAMILY MEDICINE

## 2019-01-03 PROCEDURE — 99211 OFF/OP EST MAY X REQ PHY/QHP: CPT | Performed by: FAMILY MEDICINE

## 2019-01-03 PROCEDURE — 4004F PT TOBACCO SCREEN RCVD TLK: CPT | Performed by: FAMILY MEDICINE

## 2019-01-03 PROCEDURE — G8482 FLU IMMUNIZE ORDER/ADMIN: HCPCS | Performed by: FAMILY MEDICINE

## 2019-01-03 PROCEDURE — 99213 OFFICE O/P EST LOW 20 MIN: CPT | Performed by: FAMILY MEDICINE

## 2019-01-03 RX ORDER — CYCLOBENZAPRINE HCL 10 MG
10 TABLET ORAL 2 TIMES DAILY PRN
Qty: 30 TABLET | Refills: 1 | Status: SHIPPED | OUTPATIENT
Start: 2019-01-03 | End: 2020-03-02 | Stop reason: SDUPTHER

## 2019-01-04 ENCOUNTER — TELEPHONE (OUTPATIENT)
Dept: FAMILY MEDICINE CLINIC | Age: 49
End: 2019-01-04

## 2019-01-16 ENCOUNTER — HOSPITAL ENCOUNTER (OUTPATIENT)
Age: 49
Discharge: HOME | End: 2019-01-16
Payer: COMMERCIAL

## 2019-01-16 DIAGNOSIS — Z00.00 HEALTHCARE MAINTENANCE: ICD-10-CM

## 2019-01-16 LAB
CHOLESTEROL, FASTING: 184 MG/DL
CHOLESTEROL/HDL RATIO: 3
HDLC SERPL-MCNC: 61 MG/DL
LDL CHOLESTEROL: 112 MG/DL (ref 0–130)
TRIGLYCERIDE, FASTING: 55 MG/DL
VLDLC SERPL CALC-MCNC: NORMAL MG/DL (ref 1–30)

## 2019-01-16 PROCEDURE — 36415 COLL VENOUS BLD VENIPUNCTURE: CPT

## 2019-01-16 PROCEDURE — 80061 LIPID PANEL: CPT

## 2019-07-15 ENCOUNTER — HOSPITAL ENCOUNTER (EMERGENCY)
Age: 49
Discharge: HOME | End: 2019-07-15
Attending: EMERGENCY MEDICINE
Payer: COMMERCIAL

## 2019-07-15 VITALS
DIASTOLIC BLOOD PRESSURE: 66 MMHG | HEIGHT: 65 IN | HEART RATE: 75 BPM | WEIGHT: 190 LBS | RESPIRATION RATE: 18 BRPM | OXYGEN SATURATION: 100 % | BODY MASS INDEX: 31.65 KG/M2 | TEMPERATURE: 97.3 F | SYSTOLIC BLOOD PRESSURE: 155 MMHG

## 2019-07-15 DIAGNOSIS — M54.2 NECK PAIN: Primary | ICD-10-CM

## 2019-07-15 DIAGNOSIS — G44.209 ACUTE NON INTRACTABLE TENSION-TYPE HEADACHE: ICD-10-CM

## 2019-07-15 PROCEDURE — 96375 TX/PRO/DX INJ NEW DRUG ADDON: CPT

## 2019-07-15 PROCEDURE — 6360000002 HC RX W HCPCS: Performed by: EMERGENCY MEDICINE

## 2019-07-15 PROCEDURE — 96374 THER/PROPH/DIAG INJ IV PUSH: CPT

## 2019-07-15 PROCEDURE — 99283 EMERGENCY DEPT VISIT LOW MDM: CPT

## 2019-07-15 PROCEDURE — 2580000003 HC RX 258: Performed by: EMERGENCY MEDICINE

## 2019-07-15 RX ORDER — KETOROLAC TROMETHAMINE 30 MG/ML
30 INJECTION, SOLUTION INTRAMUSCULAR; INTRAVENOUS ONCE
Status: COMPLETED | OUTPATIENT
Start: 2019-07-15 | End: 2019-07-15

## 2019-07-15 RX ORDER — METOCLOPRAMIDE HYDROCHLORIDE 5 MG/ML
10 INJECTION INTRAMUSCULAR; INTRAVENOUS ONCE
Status: COMPLETED | OUTPATIENT
Start: 2019-07-15 | End: 2019-07-15

## 2019-07-15 RX ORDER — ORPHENADRINE CITRATE 30 MG/ML
60 INJECTION INTRAMUSCULAR; INTRAVENOUS ONCE
Status: COMPLETED | OUTPATIENT
Start: 2019-07-15 | End: 2019-07-15

## 2019-07-15 RX ORDER — 0.9 % SODIUM CHLORIDE 0.9 %
1000 INTRAVENOUS SOLUTION INTRAVENOUS ONCE
Status: COMPLETED | OUTPATIENT
Start: 2019-07-15 | End: 2019-07-15

## 2019-07-15 RX ORDER — DIPHENHYDRAMINE HYDROCHLORIDE 50 MG/ML
25 INJECTION INTRAMUSCULAR; INTRAVENOUS ONCE
Status: COMPLETED | OUTPATIENT
Start: 2019-07-15 | End: 2019-07-15

## 2019-07-15 RX ADMIN — METOCLOPRAMIDE 10 MG: 5 INJECTION, SOLUTION INTRAMUSCULAR; INTRAVENOUS at 21:03

## 2019-07-15 RX ADMIN — DIPHENHYDRAMINE HYDROCHLORIDE 25 MG: 50 INJECTION, SOLUTION INTRAMUSCULAR; INTRAVENOUS at 21:03

## 2019-07-15 RX ADMIN — KETOROLAC TROMETHAMINE 30 MG: 30 INJECTION, SOLUTION INTRAMUSCULAR at 21:03

## 2019-07-15 RX ADMIN — SODIUM CHLORIDE 1000 ML: 9 INJECTION, SOLUTION INTRAVENOUS at 21:03

## 2019-07-15 RX ADMIN — ORPHENADRINE CITRATE 60 MG: 60 INJECTION INTRAMUSCULAR; INTRAVENOUS at 21:29

## 2019-07-15 ASSESSMENT — PAIN DESCRIPTION - LOCATION: LOCATION: NECK

## 2019-07-15 ASSESSMENT — PAIN SCALES - GENERAL: PAINLEVEL_OUTOF10: 8

## 2019-07-15 ASSESSMENT — PAIN DESCRIPTION - DESCRIPTORS: DESCRIPTORS: TIGHTNESS

## 2019-07-16 NOTE — ED NOTES
Patient presents to ED via private auto c/o neck pain onset yesterday and this a.m woke with horrible headache. Sensitive to light. Denies any n/v. Alert and oriented on arrival, VS stable.       Dedra Escobedo RN  07/15/19 1494

## 2019-07-16 NOTE — ED PROVIDER NOTES
EMERGENCY DEPARTMENT ENCOUNTER    Pt Name: Adarsh Sharma  MRN: [id number]  [place] 1970  Date of evaluation: 7/15/19  CHIEF COMPLAINT       Chief Complaint   Patient presents with    Neck Pain     tightness that radiates up into head causing headache    Headache     HISTORY OF PRESENT ILLNESS   66-year-old female with history of fibromyalgia and back pain presents emergency department with acute neck pain and headache. Patient reports the pain started 2 days ago in the right side of the neck. It was a sharp pain and lead to radiation up into the back of the head and to the temporal area. Patient states that she tried taking Flexeril and 800 mg Motrin yesterday and the day before. She has not had anything today. She denies any midline tenderness no fevers nausea or vomiting. No changes in vision no numbness or weakness in arms or legs. She denies any trauma or manipulation to the neck. Headache   Pain location:  R temporal  Quality:  Stabbing  Onset quality:  Gradual  Duration:  2 days  Timing:  Constant  Progression:  Worsening  Chronicity:  New      REVIEW OF SYSTEMS     Review of Systems   Neurological: Positive for headaches. All other systems reviewed and are negative.     PASTMEDICAL HISTORY     Past Medical History:   Diagnosis Date    Allergic rhinitis     Asthma     Chronic back pain     on 8/10/18 pt denies pain mgmnt    Fibromyalgia     Osteoarthritis      SURGICAL HISTORY       Past Surgical History:   Procedure Laterality Date     SECTION      DILATION AND CURETTAGE OF UTERUS       CURRENT MEDICATIONS       Discharge Medication List as of 7/15/2019 10:24 PM      CONTINUE these medications which have NOT CHANGED    Details   !! cyclobenzaprine (FLEXERIL) 10 MG tablet Take 10 mg by mouth 3 times daily as needed for Muscle spasmsHistorical Med      !! cyclobenzaprine (FLEXERIL) 10 MG tablet Take 1 tablet by mouth 3 times daily as needed for Muscle spasms, Disp-20 tablet, R-0Print      !! ibuprofen (ADVIL;MOTRIN) 800 MG tablet Take 1 tablet by mouth every 8 hours as needed for Pain, Disp-30 tablet, R-0Print      lidocaine (LIDODERM) 5 % Place 1 patch onto the skin daily 12 hours on, 12 hours off., Disp-30 patch, R-0Print      !! ibuprofen (ADVIL;MOTRIN) 800 MG tablet Take 800 mg by mouth every 6 hours as needed for PainHistorical Med      !! ibuprofen (ADVIL;MOTRIN) 600 MG tablet Take 1 tablet by mouth every 8 hours as needed for Pain, Disp-20 tablet, R-0Print      acetaminophen-codeine (TYLENOL/CODEINE #3) 300-30 MG per tablet Take 1 tablet by mouth every 4 hours as needed for Pain, Disp-20 tablet, R-0       !! - Potential duplicate medications found. Please discuss with provider. ALLERGIES     has No Known Allergies. FAMILY HISTORY     has no family status information on file. SOCIAL HISTORY       Social History     Tobacco Use    Smoking status: Current Every Day Smoker     Packs/day: 0.50     Years: 15.00     Pack years: 7.50     Types: Cigarettes    Smokeless tobacco: Never Used   Substance Use Topics    Alcohol use: No    Drug use: Yes     Comment: Edgar  2 x week     PHYSICAL EXAM     INITIAL VITALS: BP (!) 155/66   Pulse 75   Temp 97.3 °F (36.3 °C) (Oral)   Resp 18   Ht 5' 5\" (1.651 m)   Wt 190 lb (86.2 kg)   LMP 05/19/2019   SpO2 100%   BMI 31.62 kg/m²    Physical Exam   Constitutional: She is oriented to person, place, and time. She appears well-developed and well-nourished. No distress. HENT:   Head: Normocephalic. Eyes: Pupils are equal, round, and reactive to light. Cardiovascular: Normal rate, regular rhythm and normal heart sounds. Pulmonary/Chest: Effort normal and breath sounds normal. No respiratory distress. Abdominal: Soft. Bowel sounds are normal. There is no tenderness. Musculoskeletal: Normal range of motion. Arms:  Neurological: She is alert and oriented to person, place, and time. Skin: Skin is warm and dry. 10:23:36 PM      PATIENT REFERRED TO:  Tolu Fowler MD  [address] [facility]  [phone]    Schedule an appointment as soon as possible for a visit in 1 week      DISCHARGE MEDICATIONS:  Discharge Medication List as of 7/15/2019 10:24 PM        Dawson Garcia MD  Attending Emergency Physician                  Lulu Nicole MD  07/16/19 Giancarlo Kwon MD  07/20/19 3997

## 2019-08-04 ENCOUNTER — APPOINTMENT (OUTPATIENT)
Dept: CT IMAGING | Age: 49
End: 2019-08-04
Payer: COMMERCIAL

## 2019-08-04 ENCOUNTER — HOSPITAL ENCOUNTER (EMERGENCY)
Age: 49
Discharge: HOME | End: 2019-08-04
Attending: EMERGENCY MEDICINE
Payer: COMMERCIAL

## 2019-08-04 VITALS
DIASTOLIC BLOOD PRESSURE: 80 MMHG | RESPIRATION RATE: 16 BRPM | WEIGHT: 185.7 LBS | HEIGHT: 65 IN | HEART RATE: 90 BPM | TEMPERATURE: 97.5 F | SYSTOLIC BLOOD PRESSURE: 146 MMHG | OXYGEN SATURATION: 100 % | BODY MASS INDEX: 30.94 KG/M2

## 2019-08-04 DIAGNOSIS — R10.30 LOWER ABDOMINAL PAIN: Primary | ICD-10-CM

## 2019-08-04 DIAGNOSIS — N95.1 PERIMENOPAUSE: ICD-10-CM

## 2019-08-04 LAB
-: ABNORMAL
AMORPHOUS: ABNORMAL
BACTERIA: ABNORMAL
BILIRUBIN URINE: NEGATIVE
CASTS UA: ABNORMAL /LPF
CHP ED QC CHECK: NORMAL
COLOR: YELLOW
COMMENT UA: ABNORMAL
CRYSTALS, UA: ABNORMAL /HPF
EPITHELIAL CELLS UA: ABNORMAL /HPF (ref 0–5)
GLUCOSE URINE: NEGATIVE
KETONES, URINE: NEGATIVE
LEUKOCYTE ESTERASE, URINE: NEGATIVE
MUCUS: ABNORMAL
NITRITE, URINE: NEGATIVE
OTHER OBSERVATIONS UA: ABNORMAL
PH UA: 6.5 (ref 5–8)
PREGNANCY TEST URINE, POC: NEGATIVE
PROTEIN UA: NEGATIVE
RBC UA: ABNORMAL /HPF (ref 0–2)
RENAL EPITHELIAL, UA: ABNORMAL /HPF
SPECIFIC GRAVITY UA: 1.01 (ref 1–1.03)
TRICHOMONAS: ABNORMAL
TURBIDITY: CLEAR
URINE HGB: ABNORMAL
UROBILINOGEN, URINE: NORMAL
WBC UA: ABNORMAL /HPF (ref 0–5)
YEAST: ABNORMAL

## 2019-08-04 PROCEDURE — 81001 URINALYSIS AUTO W/SCOPE: CPT

## 2019-08-04 PROCEDURE — 99284 EMERGENCY DEPT VISIT MOD MDM: CPT

## 2019-08-04 PROCEDURE — 74176 CT ABD & PELVIS W/O CONTRAST: CPT

## 2019-08-04 PROCEDURE — 81025 URINE PREGNANCY TEST: CPT

## 2019-08-04 PROCEDURE — 87086 URINE CULTURE/COLONY COUNT: CPT

## 2019-08-04 RX ORDER — IBUPROFEN 800 MG/1
800 TABLET ORAL EVERY 8 HOURS PRN
Qty: 30 TABLET | Refills: 0 | Status: SHIPPED | OUTPATIENT
Start: 2019-08-04 | End: 2019-10-01 | Stop reason: ALTCHOICE

## 2019-08-04 ASSESSMENT — PAIN DESCRIPTION - FREQUENCY: FREQUENCY: INTERMITTENT

## 2019-08-04 ASSESSMENT — PAIN SCALES - GENERAL: PAINLEVEL_OUTOF10: 7

## 2019-08-04 ASSESSMENT — PAIN DESCRIPTION - PAIN TYPE: TYPE: ACUTE PAIN

## 2019-08-05 ENCOUNTER — TELEPHONE (OUTPATIENT)
Dept: FAMILY MEDICINE CLINIC | Age: 49
End: 2019-08-05

## 2019-08-05 LAB
CULTURE: NO GROWTH
HCG, PREGNANCY URINE (POC): NEGATIVE
Lab: NORMAL
SPECIMEN DESCRIPTION: NORMAL

## 2019-09-30 ENCOUNTER — HOSPITAL ENCOUNTER (EMERGENCY)
Age: 49
Discharge: HOME | End: 2019-10-01
Attending: EMERGENCY MEDICINE
Payer: COMMERCIAL

## 2019-09-30 VITALS
DIASTOLIC BLOOD PRESSURE: 68 MMHG | RESPIRATION RATE: 16 BRPM | HEIGHT: 65 IN | WEIGHT: 185 LBS | OXYGEN SATURATION: 99 % | TEMPERATURE: 97.8 F | BODY MASS INDEX: 30.82 KG/M2 | HEART RATE: 78 BPM | SYSTOLIC BLOOD PRESSURE: 120 MMHG
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
## 2019-09-30 DIAGNOSIS — S39.012A STRAIN OF LUMBAR REGION, INITIAL ENCOUNTER: ICD-10-CM

## 2019-09-30 PROCEDURE — 99283 EMERGENCY DEPT VISIT LOW MDM: CPT

## 2019-09-30 RX ORDER — ORPHENADRINE CITRATE 30 MG/ML
60 INJECTION INTRAMUSCULAR; INTRAVENOUS ONCE
Status: COMPLETED | OUTPATIENT
Start: 2019-10-01 | End: 2019-10-01

## 2019-09-30 ASSESSMENT — PAIN SCALES - GENERAL: PAINLEVEL_OUTOF10: 8

## 2019-10-01 PROCEDURE — 6360000002 HC RX W HCPCS: Performed by: NURSE PRACTITIONER

## 2019-10-01 PROCEDURE — 96372 THER/PROPH/DIAG INJ SC/IM: CPT

## 2019-10-01 RX ORDER — METHOCARBAMOL 500 MG/1
500 TABLET, FILM COATED ORAL 3 TIMES DAILY PRN
Qty: 20 TABLET | Refills: 0 | Status: SHIPPED | OUTPATIENT
Start: 2019-09-30 | End: 2019-10-10

## 2019-10-01 RX ORDER — IBUPROFEN 600 MG/1
600 TABLET ORAL EVERY 6 HOURS PRN
Qty: 20 TABLET | Refills: 0 | Status: SHIPPED | OUTPATIENT
Start: 2019-09-30 | End: 2020-05-01

## 2019-10-01 RX ADMIN — ORPHENADRINE CITRATE 60 MG: 30 INJECTION INTRAMUSCULAR; INTRAVENOUS at 00:02

## 2019-10-03 ENCOUNTER — OFFICE VISIT (OUTPATIENT)
Dept: FAMILY MEDICINE CLINIC | Age: 49
End: 2019-10-03
Payer: OTHER MISCELLANEOUS

## 2019-10-03 VITALS
BODY MASS INDEX: 30.99 KG/M2 | DIASTOLIC BLOOD PRESSURE: 67 MMHG | SYSTOLIC BLOOD PRESSURE: 105 MMHG | WEIGHT: 186 LBS | HEIGHT: 65 IN | HEART RATE: 64 BPM

## 2019-10-03 DIAGNOSIS — M54.42 ACUTE MIDLINE LOW BACK PAIN WITH LEFT-SIDED SCIATICA: ICD-10-CM

## 2019-10-03 DIAGNOSIS — Z23 NEED FOR PROPHYLACTIC VACCINATION AND INOCULATION AGAINST VARICELLA: Primary | ICD-10-CM

## 2019-10-03 PROCEDURE — 4004F PT TOBACCO SCREEN RCVD TLK: CPT | Performed by: FAMILY MEDICINE

## 2019-10-03 PROCEDURE — 3017F COLORECTAL CA SCREEN DOC REV: CPT | Performed by: FAMILY MEDICINE

## 2019-10-03 PROCEDURE — G8427 DOCREV CUR MEDS BY ELIG CLIN: HCPCS | Performed by: FAMILY MEDICINE

## 2019-10-03 PROCEDURE — G8417 CALC BMI ABV UP PARAM F/U: HCPCS | Performed by: FAMILY MEDICINE

## 2019-10-03 PROCEDURE — 99213 OFFICE O/P EST LOW 20 MIN: CPT | Performed by: FAMILY MEDICINE

## 2019-10-03 PROCEDURE — G8484 FLU IMMUNIZE NO ADMIN: HCPCS | Performed by: FAMILY MEDICINE

## 2019-10-03 RX ORDER — NAPROXEN 500 MG/1
500 TABLET ORAL 2 TIMES DAILY PRN
Qty: 60 TABLET | Refills: 0 | Status: SHIPPED | OUTPATIENT
Start: 2019-10-03 | End: 2020-05-01

## 2019-10-03 RX ORDER — PREDNISONE 20 MG/1
20 TABLET ORAL 2 TIMES DAILY
Qty: 6 TABLET | Refills: 0 | Status: SHIPPED | OUTPATIENT
Start: 2019-10-03 | End: 2019-10-06

## 2019-10-03 RX ORDER — METHOCARBAMOL 500 MG/1
500 TABLET, FILM COATED ORAL 3 TIMES DAILY PRN
Qty: 20 TABLET | Refills: 0 | Status: CANCELLED | OUTPATIENT
Start: 2019-10-03 | End: 2019-10-13

## 2019-10-03 RX ORDER — IBUPROFEN 600 MG/1
600 TABLET ORAL EVERY 6 HOURS PRN
Qty: 20 TABLET | Refills: 0 | Status: CANCELLED | OUTPATIENT
Start: 2019-10-03 | End: 2019-10-07

## 2019-10-03 ASSESSMENT — PATIENT HEALTH QUESTIONNAIRE - PHQ9
SUM OF ALL RESPONSES TO PHQ9 QUESTIONS 1 & 2: 0
SUM OF ALL RESPONSES TO PHQ QUESTIONS 1-9: 0
2. FEELING DOWN, DEPRESSED OR HOPELESS: 0
1. LITTLE INTEREST OR PLEASURE IN DOING THINGS: 0
SUM OF ALL RESPONSES TO PHQ QUESTIONS 1-9: 0

## 2019-10-05 ENCOUNTER — APPOINTMENT (OUTPATIENT)
Dept: GENERAL RADIOLOGY | Age: 49
End: 2019-10-05
Payer: COMMERCIAL

## 2019-10-05 ENCOUNTER — HOSPITAL ENCOUNTER (EMERGENCY)
Age: 49
Discharge: HOME | End: 2019-10-05
Attending: EMERGENCY MEDICINE
Payer: COMMERCIAL

## 2019-10-05 VITALS
HEIGHT: 65 IN | OXYGEN SATURATION: 99 % | HEART RATE: 72 BPM | SYSTOLIC BLOOD PRESSURE: 151 MMHG | RESPIRATION RATE: 16 BRPM | WEIGHT: 186.5 LBS | BODY MASS INDEX: 31.07 KG/M2 | DIASTOLIC BLOOD PRESSURE: 74 MMHG | TEMPERATURE: 98.3 F
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
## 2019-10-05 DIAGNOSIS — R07.89 CHEST WALL PAIN: ICD-10-CM

## 2019-10-05 DIAGNOSIS — M25.512 ACUTE PAIN OF LEFT SHOULDER: ICD-10-CM

## 2019-10-05 DIAGNOSIS — S39.012A BACK STRAIN, INITIAL ENCOUNTER: Primary | ICD-10-CM

## 2019-10-05 LAB
ABSOLUTE EOS #: 0.1 K/UL (ref 0–0.44)
ABSOLUTE IMMATURE GRANULOCYTE: 0.01 K/UL (ref 0–0.3)
ABSOLUTE LYMPH #: 2.36 K/UL (ref 1.1–3.7)
ABSOLUTE MONO #: 0.57 K/UL (ref 0.1–1.2)
ANION GAP SERPL CALCULATED.3IONS-SCNC: 14 MMOL/L (ref 9–17)
BASOPHILS # BLD: 1 % (ref 0–2)
BASOPHILS ABSOLUTE: 0.05 K/UL (ref 0–0.2)
BILIRUBIN, POC: NEGATIVE
BLOOD URINE, POC: NORMAL
BUN BLDV-MCNC: 15 MG/DL (ref 6–20)
BUN/CREAT BLD: 20 (ref 9–20)
CALCIUM SERPL-MCNC: 9.1 MG/DL (ref 8.6–10.4)
CHLORIDE BLD-SCNC: 99 MMOL/L (ref 98–107)
CHP ED QC CHECK: NORMAL
CHP ED QC CHECK: NORMAL
CLARITY, POC: CLEAR
CO2: 23 MMOL/L (ref 20–31)
COLOR, POC: YELLOW
CREAT SERPL-MCNC: 0.76 MG/DL (ref 0.5–0.9)
DIFFERENTIAL TYPE: ABNORMAL
EOSINOPHILS RELATIVE PERCENT: 1 % (ref 1–4)
GFR AFRICAN AMERICAN: >60 ML/MIN
GFR NON-AFRICAN AMERICAN: >60 ML/MIN
GFR SERPL CREATININE-BSD FRML MDRD: NORMAL ML/MIN/{1.73_M2}
GFR SERPL CREATININE-BSD FRML MDRD: NORMAL ML/MIN/{1.73_M2}
GLUCOSE BLD-MCNC: 95 MG/DL (ref 70–99)
GLUCOSE URINE, POC: NEGATIVE
HCT VFR BLD CALC: 47.3 % (ref 36.3–47.1)
HEMOGLOBIN: 15.1 G/DL (ref 11.9–15.1)
IMMATURE GRANULOCYTES: 0 %
KETONES, POC: NEGATIVE
LEUKOCYTE EST, POC: NEGATIVE
LYMPHOCYTES # BLD: 32 % (ref 24–43)
MCH RBC QN AUTO: 31 PG (ref 25.2–33.5)
MCHC RBC AUTO-ENTMCNC: 31.9 G/DL (ref 28.4–34.8)
MCV RBC AUTO: 97.1 FL (ref 82.6–102.9)
MONOCYTES # BLD: 8 % (ref 3–12)
MYOGLOBIN: <21 NG/ML (ref 25–58)
NITRITE, POC: NEGATIVE
NRBC AUTOMATED: ABNORMAL PER 100 WBC
PDW BLD-RTO: 14.1 % (ref 11.8–14.4)
PH, POC: 5.5
PLATELET # BLD: 266 K/UL (ref 138–453)
PLATELET ESTIMATE: ABNORMAL
PMV BLD AUTO: 8.6 FL (ref 8.1–13.5)
POTASSIUM SERPL-SCNC: 3.7 MMOL/L (ref 3.7–5.3)
PREGNANCY TEST URINE, POC: NEGATIVE
PROTEIN, POC: NEGATIVE
RBC # BLD: 4.87 M/UL (ref 3.95–5.11)
RBC # BLD: ABNORMAL 10*6/UL
SEG NEUTROPHILS: 58 % (ref 36–65)
SEGMENTED NEUTROPHILS ABSOLUTE COUNT: 4.25 K/UL (ref 1.5–8.1)
SODIUM BLD-SCNC: 136 MMOL/L (ref 135–144)
SPECIFIC GRAVITY, POC: 1.02
TROPONIN INTERP: ABNORMAL
TROPONIN T: ABNORMAL NG/ML
TROPONIN, HIGH SENSITIVITY: <6 NG/L (ref 0–14)
UROBILINOGEN, POC: 0.2
WBC # BLD: 7.3 K/UL (ref 3.5–11.3)
WBC # BLD: ABNORMAL 10*3/UL

## 2019-10-05 PROCEDURE — 85025 COMPLETE CBC W/AUTO DIFF WBC: CPT

## 2019-10-05 PROCEDURE — 81025 URINE PREGNANCY TEST: CPT

## 2019-10-05 PROCEDURE — 80048 BASIC METABOLIC PNL TOTAL CA: CPT

## 2019-10-05 PROCEDURE — 83874 ASSAY OF MYOGLOBIN: CPT

## 2019-10-05 PROCEDURE — 73030 X-RAY EXAM OF SHOULDER: CPT

## 2019-10-05 PROCEDURE — 36415 COLL VENOUS BLD VENIPUNCTURE: CPT

## 2019-10-05 PROCEDURE — 99284 EMERGENCY DEPT VISIT MOD MDM: CPT

## 2019-10-05 PROCEDURE — 84484 ASSAY OF TROPONIN QUANT: CPT

## 2019-10-05 PROCEDURE — 81003 URINALYSIS AUTO W/O SCOPE: CPT

## 2019-10-05 PROCEDURE — 93005 ELECTROCARDIOGRAM TRACING: CPT | Performed by: NURSE PRACTITIONER

## 2019-10-05 PROCEDURE — 71046 X-RAY EXAM CHEST 2 VIEWS: CPT

## 2019-10-05 PROCEDURE — 72100 X-RAY EXAM L-S SPINE 2/3 VWS: CPT

## 2019-10-05 RX ORDER — LIDOCAINE HCL 4% 4 G/100G
CREAM TOPICAL
Qty: 49 G | Refills: 0 | Status: SHIPPED | OUTPATIENT
Start: 2019-10-05 | End: 2021-08-31 | Stop reason: SDUPTHER

## 2019-10-05 ASSESSMENT — PAIN DESCRIPTION - LOCATION: LOCATION: BACK;ARM;SHOULDER

## 2019-10-05 ASSESSMENT — PAIN DESCRIPTION - FREQUENCY: FREQUENCY: CONTINUOUS

## 2019-10-05 ASSESSMENT — HEART SCORE: ECG: 0

## 2019-10-05 ASSESSMENT — ENCOUNTER SYMPTOMS
ABDOMINAL PAIN: 0
COLOR CHANGE: 0
SHORTNESS OF BREATH: 0
COUGH: 0

## 2019-10-05 ASSESSMENT — PAIN DESCRIPTION - ORIENTATION: ORIENTATION: LEFT

## 2019-10-05 ASSESSMENT — PAIN SCALES - WONG BAKER: WONGBAKER_NUMERICALRESPONSE: 10

## 2019-10-05 ASSESSMENT — PAIN SCALES - GENERAL: PAINLEVEL_OUTOF10: 10

## 2019-10-06 LAB — HCG, PREGNANCY URINE (POC): NEGATIVE

## 2019-10-07 ENCOUNTER — TELEPHONE (OUTPATIENT)
Dept: FAMILY MEDICINE CLINIC | Age: 49
End: 2019-10-07

## 2019-10-08 LAB
EKG ATRIAL RATE: 68 BPM
EKG P AXIS: 73 DEGREES
EKG P-R INTERVAL: 136 MS
EKG Q-T INTERVAL: 406 MS
EKG QRS DURATION: 100 MS
EKG QTC CALCULATION (BAZETT): 431 MS
EKG R AXIS: 49 DEGREES
EKG T AXIS: 67 DEGREES
EKG VENTRICULAR RATE: 68 BPM

## 2019-10-08 PROCEDURE — 93010 ELECTROCARDIOGRAM REPORT: CPT | Performed by: INTERNAL MEDICINE

## 2020-02-18 ENCOUNTER — OFFICE VISIT (OUTPATIENT)
Dept: FAMILY MEDICINE CLINIC | Age: 50
End: 2020-02-18
Payer: COMMERCIAL

## 2020-02-18 VITALS
SYSTOLIC BLOOD PRESSURE: 124 MMHG | HEART RATE: 68 BPM | HEIGHT: 66 IN | WEIGHT: 191 LBS | DIASTOLIC BLOOD PRESSURE: 74 MMHG | BODY MASS INDEX: 30.7 KG/M2

## 2020-02-18 PROCEDURE — 3017F COLORECTAL CA SCREEN DOC REV: CPT | Performed by: FAMILY MEDICINE

## 2020-02-18 PROCEDURE — G8417 CALC BMI ABV UP PARAM F/U: HCPCS | Performed by: FAMILY MEDICINE

## 2020-02-18 PROCEDURE — G8484 FLU IMMUNIZE NO ADMIN: HCPCS | Performed by: FAMILY MEDICINE

## 2020-02-18 PROCEDURE — G8427 DOCREV CUR MEDS BY ELIG CLIN: HCPCS | Performed by: FAMILY MEDICINE

## 2020-02-18 PROCEDURE — 4004F PT TOBACCO SCREEN RCVD TLK: CPT | Performed by: FAMILY MEDICINE

## 2020-02-18 PROCEDURE — 99214 OFFICE O/P EST MOD 30 MIN: CPT | Performed by: FAMILY MEDICINE

## 2020-02-18 ASSESSMENT — PATIENT HEALTH QUESTIONNAIRE - PHQ9
SUM OF ALL RESPONSES TO PHQ QUESTIONS 1-9: 0
SUM OF ALL RESPONSES TO PHQ9 QUESTIONS 1 & 2: 0
1. LITTLE INTEREST OR PLEASURE IN DOING THINGS: 0
SUM OF ALL RESPONSES TO PHQ QUESTIONS 1-9: 0
2. FEELING DOWN, DEPRESSED OR HOPELESS: 0

## 2020-02-18 NOTE — PROGRESS NOTES
Visit Information    Have you changed or started any medications since your last visit including any over-the-counter medicines, vitamins, or herbal medicines? no   Have you stopped taking any of your medications? Is so, why? -  no  Are you having any side effects from any of your medications? - no    Have you seen any other physician or provider since your last visit?  no   Have you had any other diagnostic tests since your last visit?  no   Have you been seen in the emergency room and/or had an admission in a hospital since we last saw you?  no   Have you had your routine dental cleaning in the past 6 months?  no     Do you have an active MyChart account? If no, what is the barrier?   Yes    Patient Care Team:  Pau Meraz MD as PCP - General (Family Medicine)  Pau Meraz MD as PCP - [facility]    Medical History Review  Past Medical, Family, and Social History reviewed and does not contribute to the patient presenting condition    Health Maintenance   Topic Date Due    HIV screen  05/20/1983    Cervical cancer screen  01/14/2016    Shingles Vaccine (1 of 2) 05/20/2018    Colon cancer screen colonoscopy  05/20/2018    Flu vaccine (1) 09/01/2019    Breast cancer screen  11/03/2020    Lipid screen  01/16/2024    DTaP/Tdap/Td vaccine (2 - Td) 11/01/2028    Pneumococcal 0-64 years Vaccine  Completed    Hepatitis A vaccine  Aged Out    Hepatitis B vaccine  Aged Out    Hib vaccine  Aged Out    Meningococcal (ACWY) vaccine  Aged Out

## 2020-02-21 NOTE — PROGRESS NOTES
Rufina Mckeon presents today to follow-up on a previous complaint of abnormal ebony-menopausal bleeding. She also is following up on some right buttock and leg pain. Several months ago she stated she had pain that was radiating from her right buttock down her left leg. She states it is progressively gotten worse over the last several months. She states she does feel like her left leg is slightly weaker than her right leg. She also notices that she limps due to the left leg weakness and also the persistent left leg pain. She also states that she is noticed a mass when she has been trying to massage and rub the low back area. She states that it is just above the left buttocks and that she can feel it move slightly. She states it is very tender. Denies bowel or bladder dysfunction. The history of perimenopausal bleeding is somewhat vague. It does not seem that she actually had gone a full year without a period. However some of her periods have been closer together and heavier than usual.    Vitals:    02/18/20 1024   BP: 124/74   Pulse:        Vitals reviewed. weight maintaining. Neck-neg masses, thyroid tammy. Lungs clear in all fields. CV- RRR with normal heart sounds. Neg peripheral edema  Abdomen soft and benign with no masses or organomegaly. trunk-there is a palpable deep soft tissue mass in the left para SI spinal area. It is approximately 3 inches x 1-1/2 inches. It is slightly movable and very tender. There are no skin changes associated with it. Straight leg test is equivocal on the left and negative on the right. She does have diminished reflexes of the left leg and 3+ out of 5 extension and flexion  of the lower left leg against resistance       Diagnosis Orders   1. Mass of soft tissue  MRI LUMBAR SPINE W WO CONTRAST   2. Lumbar radiculopathy, chronic  MRI LUMBAR SPINE W WO CONTRAST   3.  Abnormal perimenopausal bleeding  [address] [facility], Irina Jackson DO, OB/GYN, [facility]     Follow up

## 2020-02-29 ENCOUNTER — HOSPITAL ENCOUNTER (OUTPATIENT)
Dept: MRI IMAGING | Age: 50
Discharge: HOME | End: 2020-03-02
Payer: COMMERCIAL

## 2020-02-29 PROCEDURE — A9579 GAD-BASE MR CONTRAST NOS,1ML: HCPCS | Performed by: FAMILY MEDICINE

## 2020-02-29 PROCEDURE — 72158 MRI LUMBAR SPINE W/O & W/DYE: CPT

## 2020-02-29 PROCEDURE — 6360000004 HC RX CONTRAST MEDICATION: Performed by: FAMILY MEDICINE

## 2020-02-29 RX ADMIN — GADOTERIDOL 19 ML: 279.3 INJECTION, SOLUTION INTRAVENOUS at 14:28

## 2020-03-02 RX ORDER — CYCLOBENZAPRINE HCL 10 MG
10 TABLET ORAL 2 TIMES DAILY PRN
Qty: 30 TABLET | Refills: 1 | Status: SHIPPED | OUTPATIENT
Start: 2020-03-02 | End: 2020-03-12

## 2020-03-02 NOTE — TELEPHONE ENCOUNTER
Escribe Request for Flexeril. Patient also had MRI done and would like the results, please advise.     Last Visit Date:  Next Visit Date:  Future Appointments   Date Time Provider Franki Brody   3/3/2020  1:45 PM STA MAMMO RM 1 STAZ MAMMO STA Radiolog   3/17/2020  3:30 PM MD Grzegorz Lomas [facility]OLP   3/27/2020  8:30 AM Olfa Luz [facility] OB/[facility] [address] Maintenance   Topic Date Due    HIV screen  05/20/1983    Cervical cancer screen  01/14/2016    Shingles Vaccine (1 of 2) 05/20/2018    Colon cancer screen colonoscopy  05/20/2018    Flu vaccine (1) 09/01/2019    Breast cancer screen  11/03/2020    Lipid screen  01/16/2024    DTaP/Tdap/Td vaccine (2 - Td) 11/01/2028    Pneumococcal 0-64 years Vaccine  Completed    Hepatitis A vaccine  Aged Out    Hepatitis B vaccine  Aged Out    Hib vaccine  Aged Out    Meningococcal (ACWY) vaccine  Aged Out       No results found for: LABA1C          ( goal A1C is < 7)   No results found for: LABMICR  LDL Cholesterol (mg/dL)   Date Value   01/16/2019 112       (goal LDL is <100)   AST (U/L)   Date Value   11/01/2018 17     ALT (U/L)   Date Value   11/01/2018 11     BUN (mg/dL)   Date Value   10/05/2019 15     BP Readings from Last 3 Encounters:   02/18/20 124/74   10/05/19 (!) 151/74   10/03/19 105/67          (goal 120/80)    All Future Testing planned in [facility]  Lab Frequency Next Occurrence   US NON OB TRANSVAGINAL Once 10/10/2020       Next Visit Date:  Future Appointments   Date Time Provider Franki Brody   3/3/2020  1:45 PM STA MAMMO RM 1 STAZ MAMMO STA Radiolog   3/17/2020  3:30 PM MD Tanesha Lomas   3/27/2020  8:30 AM Olaf Luz [facility] OB/Gyn Nadia Quiñones         Patient Active Problem List:     Chronic back pain     Neck pain     Asthma     Family history of breast cancer     Hypertrophy of lingual tonsil     Abnormal computed tomography scan     Smoker

## 2020-03-09 ENCOUNTER — TELEPHONE (OUTPATIENT)
Dept: FAMILY MEDICINE CLINIC | Age: 50
End: 2020-03-09

## 2020-03-09 NOTE — TELEPHONE ENCOUNTER
Patient had MRI done on 2/29/2020. She would like a call from physician regarding the results, please advise.

## 2020-04-28 ENCOUNTER — TELEPHONE (OUTPATIENT)
Dept: FAMILY MEDICINE CLINIC | Age: 50
End: 2020-04-28

## 2020-04-28 NOTE — TELEPHONE ENCOUNTER
Please call patient now. If any redness to knee, or if pain is severe - go to the emergency room. Otherwise - can she have an appointment tomorrow to see someone here?   Electronically signed by Madison Leon MD on 4/28/2020 at 3:09 PM

## 2020-05-01 ENCOUNTER — APPOINTMENT (OUTPATIENT)
Dept: GENERAL RADIOLOGY | Age: 50
End: 2020-05-01
Payer: COMMERCIAL

## 2020-05-01 ENCOUNTER — HOSPITAL ENCOUNTER (EMERGENCY)
Age: 50
Discharge: HOME | End: 2020-05-01
Attending: EMERGENCY MEDICINE
Payer: COMMERCIAL

## 2020-05-01 ENCOUNTER — OFFICE VISIT (OUTPATIENT)
Dept: FAMILY MEDICINE CLINIC | Age: 50
End: 2020-05-01
Payer: COMMERCIAL

## 2020-05-01 VITALS
TEMPERATURE: 98.1 F | DIASTOLIC BLOOD PRESSURE: 78 MMHG | BODY MASS INDEX: 32.93 KG/M2 | WEIGHT: 204 LBS | HEART RATE: 70 BPM | SYSTOLIC BLOOD PRESSURE: 121 MMHG

## 2020-05-01 VITALS
TEMPERATURE: 97.8 F | SYSTOLIC BLOOD PRESSURE: 128 MMHG | RESPIRATION RATE: 16 BRPM | HEART RATE: 86 BPM | DIASTOLIC BLOOD PRESSURE: 70 MMHG | OXYGEN SATURATION: 98 %

## 2020-05-01 PROCEDURE — 96372 THER/PROPH/DIAG INJ SC/IM: CPT

## 2020-05-01 PROCEDURE — G8417 CALC BMI ABV UP PARAM F/U: HCPCS | Performed by: FAMILY MEDICINE

## 2020-05-01 PROCEDURE — 99213 OFFICE O/P EST LOW 20 MIN: CPT | Performed by: FAMILY MEDICINE

## 2020-05-01 PROCEDURE — 4004F PT TOBACCO SCREEN RCVD TLK: CPT | Performed by: FAMILY MEDICINE

## 2020-05-01 PROCEDURE — 73562 X-RAY EXAM OF KNEE 3: CPT

## 2020-05-01 PROCEDURE — 99283 EMERGENCY DEPT VISIT LOW MDM: CPT

## 2020-05-01 PROCEDURE — 3017F COLORECTAL CA SCREEN DOC REV: CPT | Performed by: FAMILY MEDICINE

## 2020-05-01 PROCEDURE — 6360000002 HC RX W HCPCS: Performed by: STUDENT IN AN ORGANIZED HEALTH CARE EDUCATION/TRAINING PROGRAM

## 2020-05-01 PROCEDURE — G8427 DOCREV CUR MEDS BY ELIG CLIN: HCPCS | Performed by: FAMILY MEDICINE

## 2020-05-01 RX ORDER — LIDOCAINE HYDROCHLORIDE 10 MG/ML
5 INJECTION, SOLUTION INFILTRATION; PERINEURAL ONCE
Status: DISCONTINUED | OUTPATIENT
Start: 2020-05-01 | End: 2020-05-01

## 2020-05-01 RX ORDER — KETOROLAC TROMETHAMINE 30 MG/ML
30 INJECTION, SOLUTION INTRAMUSCULAR; INTRAVENOUS ONCE
Status: COMPLETED | OUTPATIENT
Start: 2020-05-01 | End: 2020-05-01

## 2020-05-01 RX ORDER — IBUPROFEN 800 MG/1
800 TABLET ORAL EVERY 8 HOURS PRN
Qty: 30 TABLET | Refills: 0 | Status: SHIPPED | OUTPATIENT
Start: 2020-05-01 | End: 2021-08-28 | Stop reason: SDUPTHER

## 2020-05-01 RX ORDER — ACETAMINOPHEN 500 MG
1000 TABLET ORAL EVERY 6 HOURS PRN
Qty: 30 TABLET | Refills: 0 | Status: SHIPPED | OUTPATIENT
Start: 2020-05-01

## 2020-05-01 RX ADMIN — KETOROLAC TROMETHAMINE 30 MG: 30 INJECTION, SOLUTION INTRAMUSCULAR at 17:32

## 2020-05-01 ASSESSMENT — PAIN DESCRIPTION - PAIN TYPE: TYPE: ACUTE PAIN

## 2020-05-01 ASSESSMENT — PAIN DESCRIPTION - DESCRIPTORS: DESCRIPTORS: THROBBING;PINS AND NEEDLES

## 2020-05-01 ASSESSMENT — ENCOUNTER SYMPTOMS
EYE DISCHARGE: 0
SHORTNESS OF BREATH: 0
EYE REDNESS: 0
COLOR CHANGE: 0
ABDOMINAL PAIN: 0

## 2020-05-01 ASSESSMENT — PAIN SCALES - GENERAL
PAINLEVEL_OUTOF10: 10
PAINLEVEL_OUTOF10: 10

## 2020-05-01 ASSESSMENT — PAIN DESCRIPTION - LOCATION: LOCATION: KNEE

## 2020-05-01 ASSESSMENT — PAIN DESCRIPTION - FREQUENCY: FREQUENCY: CONTINUOUS

## 2020-05-01 ASSESSMENT — PAIN DESCRIPTION - ORIENTATION: ORIENTATION: RIGHT

## 2020-05-01 NOTE — ED PROVIDER NOTES
use: Yes     Comment: Edgar  2 x week    Sexual activity: Not Currently   Lifestyle    Physical activity     Days per week: Not on file     Minutes per session: Not on file    Stress: Not on file   Relationships    Social connections     Talks on phone: Not on file     Gets together: Not on file     Attends [employer] service: Not on file     Active member of club or organization: Not on file     Attends meetings of clubs or organizations: Not on file     Relationship status: Not on file    Intimate partner violence     Fear of current or ex partner: Not on file     Emotionally abused: Not on file     Physically abused: Not on file     Forced sexual activity: Not on file   Other Topics Concern    Not on file   Social History Narrative    Not on file       History reviewed. No pertinent family history. Allergies:  Patient has no known allergies. Home Medications:  Prior to Admission medications    Medication Sig Start Date End Date Taking? Authorizing Provider   acetaminophen (APAP EXTRA STRENGTH) 500 MG tablet Take 2 tablets by mouth every 6 hours as needed for Pain 5/1/20  Yes Jose Fletcher, DO   ibuprofen (ADVIL;MOTRIN) 800 MG tablet Take 1 tablet by mouth every 8 hours as needed for Pain 5/1/20  Yes Jose Fletcher, DO   Lidocaine HCl (ASPERCREME W/LIDOCAINE) 4 % CREA Apply topically to affected areas 3 times daily as needed. Patient not taking: Reported on 5/1/2020 10/5/19   MICHELLE Garcia - CNP       REVIEW OF SYSTEMS    (2-9 systems for level 4, 10 or more for level 5)      Review of Systems   Constitutional: Negative for chills and fever. Eyes: Negative for discharge and redness. Respiratory: Negative for shortness of breath. Cardiovascular: Negative for chest pain. Gastrointestinal: Negative for abdominal pain. Genitourinary: Negative for flank pain. Musculoskeletal: Positive for arthralgias and joint swelling. Skin: Negative for color change and rash. every 6 hours as needed for Pain     Dispense:  30 tablet     Refill:  0    ibuprofen (ADVIL;MOTRIN) 800 MG tablet     Sig: Take 1 tablet by mouth every 8 hours as needed for Pain     Dispense:  30 tablet     Refill:  0       DDX: Baker's cyst versus musculoskeletal pain versus contusion versus septic arthritis    Initial MDM/Plan: 44 y.o. female who presents with pain upon ambulation to right knee. Did discuss with family medicine physician on-call for Dr. Lissette Gonzalez. They are agreeable to having us aspirate as well as inject steroid into the knee. Will get x-ray of knee rule out any bony abnormalities. Toradol injection. Plan aspiration injection. DIAGNOSTIC RESULTS / EMERGENCY DEPARTMENT COURSE / MDM     LABS:  Labs Reviewed - No data to display      RADIOLOGY:  Xr Knee Right (3 Views)    Result Date: 5/1/2020  EXAMINATION: THREE XRAY VIEWS OF THE RIGHT KNEE 5/1/2020 4:55 pm COMPARISON: October 14, 2013 HISTORY: ORDERING SYSTEM PROVIDED HISTORY: knee pain; baker cyst TECHNOLOGIST PROVIDED HISTORY: knee pain; baker cyst FINDINGS: 3 views of the knee demonstrate no acute fracture or dislocation. Normal bony mineralization. No suspicious osseous lesion. Minimal tricompartmental osteophytosis is seen. No joint space narrowing. No soft tissue swelling. No knee joint effusion. 1. No acute osseous abnormality of the right knee. 2. Minimal knee osteoarthritis. 3. No knee joint effusion. EMERGENCY DEPARTMENT COURSE:  CT negative for any acute bony process some slight osteoarthritis. Discussed with patient that we would aspirate the knee sending the fluid to lab to rule out any infectious process and then could inject steroids. Discussed that this would take most likely a 2 to 3 hours. Patient originally agreeable to aspiration however patient requesting discharge and stating that she did not want a wait around any longer. Suspicion for a septic arthritis is low.   Patient well-appearing able to made to edit the dictations but occasionally words are mis-transcribed.)       Ming Cuevas DO  Resident  05/01/20 2017

## 2020-05-01 NOTE — ED NOTES
Pt presents to ED with complaints of right knee pain. Pt states she's had pain and swelling for several weeks but the pain has gotten worse in last 5 days. Pt denies injury to knee. Pt states she was seen by her PCP and told to come here for possible xray or US. Pt describes pain as throbbing and pins and needles. Pt's been taking ibuprofen and acetaminophen at home without relief. VSS.  Will continue to monitor     Adarsh Webb RN  05/01/20 0493

## 2020-05-02 ENCOUNTER — CARE COORDINATION (OUTPATIENT)
Dept: CARE COORDINATION | Age: 50
End: 2020-05-02

## 2020-05-04 NOTE — PROGRESS NOTES
CC:    Several week H/O right knee pain. NO H/O trauma. States initially had been mild swelling with large \"lump\" back of knee. Last several days has been 10/10 pain. Has been taking Ibuprofen with little relief. Denies fevers or chills. Has not noted redness of knee. Vitals:    05/01/20 1457   BP: 121/78   Pulse: 70   Temp: 98.1 °F (36.7 °C)     General appearance nontoxic, alert, pleasant. Right knee is painful to extension and flexion and is stable. Large tender protrusion of soft tissue, compressible, flexor surface of knee. Mild, tender edema extending anteriorly and around patella. No redness or heat of knee. Baker's cyst most likely. Due to severity of pain, I contacted the [facility] ER and they agreed patient is a candidate for them to evaluate for possible ultra sound guided aspiration or possible ortho consult. Patient agreed to proceed immediately to the Emergency Department.

## 2020-05-15 ENCOUNTER — TELEPHONE (OUTPATIENT)
Dept: FAMILY MEDICINE CLINIC | Age: 50
End: 2020-05-15

## 2020-06-02 ENCOUNTER — HOSPITAL ENCOUNTER (OUTPATIENT)
Age: 50
Setting detail: SPECIMEN
Discharge: HOME | End: 2020-06-02
Payer: COMMERCIAL

## 2020-06-02 ENCOUNTER — OFFICE VISIT (OUTPATIENT)
Dept: OBGYN | Age: 50
End: 2020-06-02
Payer: COMMERCIAL

## 2020-06-02 VITALS
HEIGHT: 66 IN | DIASTOLIC BLOOD PRESSURE: 77 MMHG | BODY MASS INDEX: 32.62 KG/M2 | WEIGHT: 203 LBS | HEART RATE: 79 BPM | SYSTOLIC BLOOD PRESSURE: 123 MMHG

## 2020-06-02 PROCEDURE — 99213 OFFICE O/P EST LOW 20 MIN: CPT | Performed by: OBSTETRICS & GYNECOLOGY

## 2020-06-02 PROCEDURE — 99386 PREV VISIT NEW AGE 40-64: CPT | Performed by: STUDENT IN AN ORGANIZED HEALTH CARE EDUCATION/TRAINING PROGRAM

## 2020-06-02 RX ORDER — PAROXETINE 10 MG/1
10 TABLET, FILM COATED ORAL DAILY
Qty: 30 TABLET | Refills: 2 | Status: ON HOLD | OUTPATIENT
Start: 2020-06-02 | End: 2021-09-03 | Stop reason: ALTCHOICE

## 2020-06-02 NOTE — PROGRESS NOTES
file     Emotionally abused: Not on file     Physically abused: Not on file     Forced sexual activity: Not on file   Other Topics Concern    Not on file   Social History Narrative    Not on file         MEDICATIONS:  Current Outpatient Medications   Medication Sig Dispense Refill    PARoxetine (PAXIL) 10 MG tablet Take 1 tablet by mouth daily 30 tablet 2    acetaminophen (APAP EXTRA STRENGTH) 500 MG tablet Take 2 tablets by mouth every 6 hours as needed for Pain 30 tablet 0    ibuprofen (ADVIL;MOTRIN) 800 MG tablet Take 1 tablet by mouth every 8 hours as needed for Pain 30 tablet 0    Lidocaine HCl (ASPERCREME W/LIDOCAINE) 4 % CREA Apply topically to affected areas 3 times daily as needed. (Patient not taking: Reported on 5/1/2020) 49 g 0     No current facility-administered medications for this visit. ALLERGIES:  Allergies as of 06/02/2020    (No Known Allergies)           Symptoms of decreased mood absent  Symptoms of anhedonia absent    **If either question is answered in a  positive fashion then complete the PHQ9 Scoring Evaluation and make the appropriate referral**      Immunization status: stated as current, but no records available. Gynecologic History:  Menarche: 13 yo  Menopause-perimenopausal at this time     Patient's last menstrual period was 02/01/2020 (approximate).     Sexually Active: Yes    STD History: No     Permanent Sterilization: No   Reversible Birth Control: No        Hormone Replacement Exposure: No      Genetic Qualified Family History of Breast, Ovarian , Colon or Uterine Cancer: Yes PGM and Paternal Aunt with breast cancer       Preventative Health Testing:    Health Maintenance Due:  Health Maintenance Due   Topic Date Due    HIV screen  05/20/1983    Cervical cancer screen  01/14/2016    Shingles Vaccine (1 of 2) 05/20/2018    Colon cancer screen colonoscopy  05/20/2018       Date of Last Pap Smear: 1/2013- negative  Abnormal Pap Smear History: were Palpable in the neck , axilla or groin. Neck and EENT:  The neck was supple. There were no masses   The thyroid was not enlarged and had no masses. Perrla, EOMI B/L, TMI B/L No Abnormalities. Throat inspected-No exudates or Masses, Nares Patent No Masses        Respiratory: The lungs were auscultated and found to be clear. There were no rales, rhonchi or wheezes. There was a good respiratory effort. Cardiovascular: The heart was in a regular rate and rhythm. Extremities: The patients extremities were without calf tenderness, edema, or varicosities. There was full range of motion in all four extremities. Abdomen: The abdomen was soft and non-tender. There was no guarding, rebound or rigidity. Abdominal Scars: pfannensteil- healed well, no evidence of infection    Psych: The patient had a normal Orientation to: Time, Place, Person, and Situation  There is no Mood / Affect changes    Breast:  (Chest)  normal appearance, no masses or tenderness  Self breast exams were reviewed in detail. Literature was given. Pelvic Exam:  External genitalia: normal general appearance  Urinary system: urethral meatus normal  Vaginal: normal mucosa without prolapse or lesions  Cervix: normal appearance and negative CMT  Adnexa: normal bimanual exam and negative tenderness or masses  Uterus: normal single, nontender and mid-position    Rectal Exam:  Normal- no masses and external hemorrhoids,       Musculosk:  Normal Gait and station was noted. Digits were evaluated without abnormal findings. Range of motion, stability and strength were evaluated and found to be appropriate for the patients age. POC Cultures:  No results found for this visit on 06/02/20. ASSESSMENT:      44 y.o. Annual   Diagnosis Orders   1. Preventative health care  PAP SMEAR    C.trachomatis N.gonorrhoeae DNA    VAGINITIS DNA PROBE    ALICIA DIGITAL SCREEN W OR WO CAD BILATERAL   2.  Vasomotor symptoms due to menopause reviewed. (Cx pending, pt requested Cx)  Routine health maintenance per patients PCP. Orders Placed This Encounter   Procedures    C.trachomatis N.gonorrhoeae DNA     Standing Status:   Future     Standing Expiration Date:   2021     Order Specific Question:   Source: Answer:   Cervical    VAGINITIS DNA PROBE     Standing Status:   Future     Standing Expiration Date:   2021    ALICIA DIGITAL SCREEN W OR WO CAD BILATERAL     Standing Status:   Future     Standing Expiration Date:   2021     Order Specific Question:   Reason for exam:     Answer:   screening    PAP SMEAR     Patient History:    Patient's last menstrual period was 2020 (approximate). OBGYN Status: Having periods  Past Surgical History:  No date:  SECTION  No date: DILATION AND CURETTAGE OF UTERUS      Comment:  SAB      Social History    Tobacco Use      Smoking status: Current Every Day Smoker        Packs/day: 0.50        Years: 15.00        Pack years: 7.5        Types: Cigarettes      Smokeless tobacco: Never Used       Standing Status:   Future     Standing Expiration Date:   2021     Order Specific Question:   Collection Type     Answer: Thin Prep     Order Specific Question:   Prior Abnormal Pap Test     Answer:   No     Order Specific Question:   Screening or Diagnostic     Answer:   Screening     Order Specific Question:   HPV Requested? Answer:   Yes     Order Specific Question:   High Risk Patient     Answer:   N/A         Attending Physician Statement  I have personally discussed the care of Amina Morejon, including pertinent history and exam findings with the resident. I have reviewed and edited their note in the electronic medical record as indicated. The key elements of all parts of the encounter have been performed/reviewed by me. I agree with the assessment, plan and orders as documented by the resident.      The level of care submitted represents to the best of my ability the care documented in the medical record today. This service has been performed in part by a resident under the direction of a teaching physician.         Attending's Name:  Yonatan Canales MD

## 2020-06-03 LAB
DIRECT EXAM: ABNORMAL
Lab: ABNORMAL
SPECIMEN DESCRIPTION: ABNORMAL

## 2020-06-04 ENCOUNTER — TELEPHONE (OUTPATIENT)
Dept: OBGYN | Age: 50
End: 2020-06-04

## 2020-06-04 LAB
C TRACH DNA GENITAL QL NAA+PROBE: NEGATIVE
N. GONORRHOEAE DNA: NEGATIVE
SPECIMEN DESCRIPTION: NORMAL

## 2020-06-04 NOTE — TELEPHONE ENCOUNTER
Fax received from [facility] care requesting a diagnosis change on mammogram order.   Covered diagnosis is Z12.31    Order pended

## 2020-06-05 LAB
HPV SAMPLE: NORMAL
HPV, GENOTYPE 16: NOT DETECTED
HPV, GENOTYPE 18: NOT DETECTED
HPV, HIGH RISK OTHER: NOT DETECTED
HPV, INTERPRETATION: NORMAL
SPECIMEN DESCRIPTION: NORMAL

## 2020-06-08 LAB — CYTOLOGY REPORT: NORMAL

## 2020-07-02 ENCOUNTER — HOSPITAL ENCOUNTER (OUTPATIENT)
Dept: MAMMOGRAPHY | Age: 50
Discharge: HOME | End: 2020-07-04
Payer: COMMERCIAL

## 2020-07-02 PROCEDURE — 77063 BREAST TOMOSYNTHESIS BI: CPT

## 2020-07-08 ENCOUNTER — TELEPHONE (OUTPATIENT)
Dept: ONCOLOGY | Age: 50
End: 2020-07-08

## 2020-07-08 NOTE — TELEPHONE ENCOUNTER
Returned call from Dr. Ford Heard. He called me on this patient and her recent mammogram results. I looked up mammogram.  I left message for Dr. Ford Heard that I see her elevated tyrer-kusick score would qualify her for the high risk breast clinic. I explained what that consult looks like. I let him know that he can order it through [employer] or I can assist him with ordering it. Left my name and contact phone number for any additional questions. I let him know locations of where patient can be seen by med onc and genetic counselor. I let him know once ordered our  at [facility] will call patient to schedule an appointment.

## 2020-07-09 NOTE — RESULT ENCOUNTER NOTE
Mammogram is negative but based on family history of breast cancer the patient is at increased risk to develop breast cancer in her lifetime. The patient is a candidate Genetic Counseling and to determine is she is eligible for breast cancer gene testing and closer surveillance. Referral was placed for [facility]. Please notify the patient.

## 2020-07-10 ENCOUNTER — TELEPHONE (OUTPATIENT)
Dept: OBGYN | Age: 50
End: 2020-07-10

## 2020-07-10 NOTE — TELEPHONE ENCOUNTER
Obstetric/Gynecology Resident Telephone Encounter Note    Patient called [facility] in regards to her mammogram results. She had missed a phone call from the office and also a missed called from the breast cancer clinic. Writer reviewed negative mammogram results with the patient and informed her of her elevated risk of developing breast cancer within her lifetime based on her family history. Per chart review there is a note by Dr. Angel Fonseca stating the above and that the patient is a candidate for genetic counseling and to determine eligibility of breast cancer gene testing and closer surveillance. She has been referred to [facility]. The patient states that she received a voicemail from this clinic and will call them back for scheduling. Patient verbalized understanding and agreement to plan of care. All questions answered.      Ashlie Lyons DO  OB/GYN Resident, PGY2  [facility]  7/10/2020, 6:40 PM

## 2020-09-18 ENCOUNTER — INITIAL CONSULT (OUTPATIENT)
Dept: ONCOLOGY | Age: 50
End: 2020-09-18
Payer: COMMERCIAL

## 2020-09-18 ENCOUNTER — CARE COORDINATION (OUTPATIENT)
Dept: CARE COORDINATION | Age: 50
End: 2020-09-18

## 2020-09-18 ENCOUNTER — TELEPHONE (OUTPATIENT)
Dept: ONCOLOGY | Age: 50
End: 2020-09-18

## 2020-09-18 VITALS
DIASTOLIC BLOOD PRESSURE: 74 MMHG | SYSTOLIC BLOOD PRESSURE: 112 MMHG | BODY MASS INDEX: 33.8 KG/M2 | HEART RATE: 51 BPM | WEIGHT: 198 LBS | TEMPERATURE: 97.6 F | RESPIRATION RATE: 16 BRPM | HEIGHT: 64 IN

## 2020-09-18 PROCEDURE — 99201 HC NEW PT, E/M LEVEL 1: CPT | Performed by: INTERNAL MEDICINE

## 2020-09-18 PROCEDURE — G8427 DOCREV CUR MEDS BY ELIG CLIN: HCPCS | Performed by: INTERNAL MEDICINE

## 2020-09-18 PROCEDURE — G8417 CALC BMI ABV UP PARAM F/U: HCPCS | Performed by: INTERNAL MEDICINE

## 2020-09-18 PROCEDURE — 96040 PR GENETIC COUNSELING, EACH 30 MIN: CPT | Performed by: GENETIC COUNSELOR, MS

## 2020-09-18 PROCEDURE — 99243 OFF/OP CNSLTJ NEW/EST LOW 30: CPT | Performed by: INTERNAL MEDICINE

## 2020-09-18 NOTE — CARE COORDINATION
Rafita Reid declines care coordination at this time. She will reach out to her PCP should she feels she could benefit from care coordination in the future.

## 2020-09-18 NOTE — PROGRESS NOTES
3 [facility]   Hereditary Cancer Risk Assessment     Name: Elle Posadas   YOB: 1970   Date of Consultation: 9/18/20     Ms. Maribell Riojas was seen at the [place] [id number] for genetic counseling on 9/18/20. She is also seen by medical oncologist Dr. Yuvonne Councilman. Ms. Maribell Riojas was referred by Dr. Melita Lock to discuss her elevated lifetime risk for breast cancer which was calculated at her most recent mammogram.     PERSONAL HISTORY   Ms. Maribell Riojas is a 44 y.o.  female with no personal history of cancer. She reports menarche at age 13, first child at age 32, and underwent menopause at age 44. Ms. Maribell Riojas has never had a hysterectomy and both ovaries are intact. She has never taken hormone replacement therapy. Ms. Maribell Riojas reports annual mammograms. She has never had a breast MRI or required a breast biopsy. At her mammogram on 7/2/20, a lifetime risk for breast cancer was calculated. According to the [employer] risk model, Ms. Carroll's lifetime risk for breast cancer is approximately 24.4%. FAMILY HISTORY  Ms. Maribell Riojas has two daughters and one son. She has three maternal half sisters under age 48 and one paternal half sister. She has limited information regarding her paternal family history; however, she is aware of at least 3 paternal aunts with breast cancer and her paternal grandmother with breast cancer. Ms. Maribell Riojas reports unknown ancestry and denies any known Ashkenazi Yazidism heritage. RISK ASSESSMENT   We discussed that approximately 5-10% of cancers are due to a hereditary gene mutation which causes an increased risk for certain cancers. Hereditary cancers are typically diagnosed at younger ages (under age 46y) and occur in multiple generations of a family.  Multiple individuals with the same type of cancer (example: breast) or uncommon cancers (example: ovarian, pancreatic, male breast cancer) are also features of

## 2020-09-18 NOTE — PROGRESS NOTES
BRIEF CASE HISTORY: Patient is a very pleasant 44 y.o. female who is referred to us for consultation for the high risk  breast cancer clinic. She is evaluated by myself and the genetic counselor. She does not have any personal history of breast cancer but she has significant family history. She underwent screening mammogram and during the screen, she took the [employer] high risk questionnaire. The questionnaire showed that her lifetime risk of breast cancer is 24.4%. which falls into the high risk category. She is referred to us for counseling, discussion of risk reduction modalities as well as genetic evaluation. GYN history     Breast density: Hydrogenous  Menarche 10 years  First live birth: 32 years  Menopause 44 years  Last mammogram 2020       PAST MEDICAL HISTORY:  has a past medical history of Allergic rhinitis, Asthma, Chronic back pain, Fibromyalgia, Lumbar radiculopathy, and Osteoarthritis. PAST SURGICAL HISTORY:   Past Surgical History:   Procedure Laterality Date     SECTION      DILATION AND CURETTAGE OF UTERUS      SAB        CURRENT MEDICATIONS:  has a current medication list which includes the following prescription(s): acetaminophen, ibuprofen, paroxetine, and lidocaine hcl. ALLERGIES:  has No Known Allergies. FAMILY HISTORY: detailed family history was obtained, and reviewed, it is detailed in the genetic counselor note. SOCIAL HISTORY:   reports that she has been smoking cigarettes. She has a 30.00 pack-year smoking history. She has never used smokeless tobacco. She reports previous drug use. She reports that she does not drink alcohol. REVIEW OF SYSTEMS:   General: No fever or night sweats. Weight is stable. ENT: No double or blurred vision, no tinnitus or hearing problem, no dysphagia or sore throat   Respiratory: No chest pain, no shortness of breath, no cough or hemoptysis. Cardiovascular: Denies chest pain, PND or orthopnea.  No L E swelling or palpitations. Gastrointestinal: No nausea or vomiting, abdominal pain, diarrhea or constipation. Genitourinary: Denies dysuria, hematuria, frequency, urgency or incontinence. Neurological: Denies headaches, decreased LOC, no sensory or motor focal deficits. Musculoskeletal: No arthralgia no back pain or joint swelling. PHYSICAL EXAM: Shows a well appearing 44 y.o. female who is not in pain or distress. Vital Signs: Blood pressure 112/74, pulse 51, temperature 97.6 °F (36.4 °C), temperature source Oral, resp. rate 16, height 5' 4\" (1.626 m), weight 198 lb (89.8 kg), not currently breastfeeding. HEENT: Normocephalic and atraumatic. Pupils are equal, round, reactive to light and accommodation. Extraocular muscles are intact. Neck: Showed no JVD, no carotid bruit . Lungs: Clear to auscultation bilaterally. Heart: Regular without any murmur. Abdomen: Soft, nontender. No hepatosplenomegaly. Extremities: Lower extremities show no edema, clubbing, or cyanosis. Breasts: Examination not done today. (declined)  Neuro exam: intact cranial nerves bilaterally no motor or sensory deficit, gait is normal. Lymphatic: no adenopathy appreciated in the supraclavicular, axillary, cervical or inguinal area    REVIEW OF LABORATORY DATA:     Results for orders placed or performed during the hospital encounter of 06/02/20   VAGINITIS DNA PROBE    Specimen: Vaginal   Result Value Ref Range    Specimen Description . VAGINA     Special Requests NOT REPORTED     Direct Exam POSITIVE for Gardnerella vaginalis. (A)     Direct Exam NEGATIVE for Candida sp. Direct Exam NEGATIVE for Trichomonas vaginalis     Direct Exam       Method of testing is a DNA probe intended for detection and identification of Candida species, Gardnerella vaginalis, and Trichomonas vaginalis nucleic acid in vaginal fluid specimens from patients with symptoms of vaginitis/vaginosis.    C.trachomatis N.gonorrhoeae DNA    Specimen: Cervix Result Value Ref Range    Specimen Description . CERVIX     C. trachomatis DNA NEGATIVE NEGATIVE    N. gonorrhoeae DNA NEGATIVE NEGATIVE   Human papillomavirus (HPV) DNA probe thin prep high risk   Result Value Ref Range    Specimen Description . CERVIX     HPV Sample . THIN PREP     HPV, Genotype 16 Not Detected Not Detected    HPV, Genotype 18 Not Detected Not Detected    HPV, High Risk Other Not Detected Not Detected    HPV, Interpretation         GYN Cytology   Result Value Ref Range    Cytology Report       INTERPRETATION    Cervical material, (ThinPrep vial, Imaging-assisted review):  Specimen Adequacy:       Satisfactory for evaluation.       - Endocervical/transformation zone component present. Descriptive Diagnosis:       Negative for intraepithelial lesion or malignancy. Shift in silvio suggestive of bacterial vaginosis. Comments:       High Risk HPV testing was ordered. Cytotechnologist:   Nette JOHNSON(ASCP)  **Electronically Signed Out**  ey/6/8/2020          Procedure/Addendum  HPV Procedure Report     Date Ordered:     6/4/2020     Status: Signed  Out       Date Complete:     6/4/2020     By: Nando JOHNSON(ASCP)       Date Reported:     6/9/2020       INTERPRETATION  Roche HPV DNA High Risk                                  HPV Sample               Thin Prep                    (Ref Range)  HPV Type 16               Not Detected                    (Not  Detected)  HPV Type 18               Not Detected                    (Not  Detected)  Other High Risk HPV     Not Detected                     (Not Detected)       This test amplifies and detects DNA of 14 high-risk HPV types  associated with cervical cancer and its precursor lesions (HPV types  16, 18, 31, 33, 35, 39, 45, 51, 52, 56, 58, 59, 66, and 68). Sensitivity may be affected by specimen collection methods, stage of  infection, and the presence of interfering substances.   Results should  be interpreted in conjunction with other available and the even more rare possibility of vaginal bleeding, endometrial hyperplasia or even endometrial carcinoma. After thorough discussion and careful explanation of the above, the patient chose not to pursue anti-hormonal therapy for chemoprevention    Patient was counseled tobacco cessation    We also recommended annual screening MRI breast which can be alternated with screening mammogram every year done 6 months apart. This note is created with the assistance of a speech recognition program.  While intending to generate a document that actually reflects the content of the visit, the document can still have some errors including those of syntax and sound a like substitutions which may escape proof reading. It such instances, actual meaning can be extrapolated by contextual diversion.

## 2020-09-30 ENCOUNTER — TELEPHONE (OUTPATIENT)
Dept: ONCOLOGY | Age: 50
End: 2020-09-30

## 2020-09-30 NOTE — TELEPHONE ENCOUNTER
[facility]   Hereditary Cancer Risk Assessment     Name: Sarah Jarquin  YOB: 1970  Date of Results Disclosure: 09/30/20      HISTORY   Ms. Ovidio Garrison was seen for genetic counseling on 9/18/20 at the request of Lieutenant Radha MD due to her family history of breast cancer. At that time, Ms. Ovidio Garrison chose to pursue genetic testing via the CancerNext Expanded + RNA gene panel. These results were discussed with Ms. Ovidio Garrison on 09/30/20 via telephone. A summary of Ms. Carroll's results and recommendations are below. RESULTS  Molecular Imprints [facility] CancerNext-Expanded Panel + RNAinsight: NEGATIVE - NO CLINICALLY SIGNIFICANT MUTATIONS DETECTED   This panel included the analysis of 77 genes associated with hereditary cancer including: AIP, ALK, APC, AMIRA, BAP1, BARD1, BLM, BMPR1A, BRCA1, BRCA2, BRIP1, CDC73, CDH1, CDK4, CDKN1B, CDKN2A, CHEK2, CTNNA1, DICER1, EGFR, EGLN1, EPCAM, FANCC, FH, FLCN, GALNT12, GREM1, HOXB13, KIF1B, KIT1, LZTR1, MAX, MEN1, MET, MITF, MLH1, MSH2, MSH3, MSH6, MUTYH, NBN, NF1, NF2, NTHL1, PALB2, PDGFRA, PHOX2B, PMS2, POLD1, POLE, POT1, HCQOI8Z, PTCH1, PTEN, RAD51C, RAD51D, RB1, RECQL, RET, SDHA, SDHAF2, SDHB, SDHC, ,SDHD, SMAD4, SMARCA4, SMARCB1, SMARCE1, STK11, SUFU, BATR816, TP53, TSC1, TSC2, VHL, and XRCC2. In addition, no clinically relevant aberrant RNA transcripts were detected in select genes. Please refer to genetic test report for technical details. We discussed that Ms. Carroll's negative test result greatly reduces the likelihood that she carries a hereditary gene mutation. However, it is possible that her family history of cancer is due to a hereditary mutation which she did not inherit. It is also possible that her family history of cancer may be due to a gene for which testing was not performed or which has yet to be discovered. RECOMMENDATIONS  1) While Ms. Ovidio Garrison does not carry a known hereditary gene mutation, her risk for breast cancer may still be elevated due to her remaining family history of breast cancer. This includes at least three paternal aunts and her paternal grandmother. Based on Ms. Carroll's personal risk factors and family history of breast cancer, her estimated lifetime risk for breast cancer is 24% according to the [employer] risk model. The [employer] (NCCN) recommends that women with a lifetime risk of breast cancer above 20% consider the following screening and risk reducing options:     NCCN Recommendation Age to Begin Frequency    Breast awareness - Women should be familiar with their breasts and promptly report changes to their healthcare provider. Periodic, consistent breast self-examination (BSE) may be beneficial  Individualized  N/A    Clinical Breast Examination  Individualized Every 6-12 months   Breast MRI with contrast  36years old  Annual   Mammogram (consider tomosynthesis)  34years old  Annual    Consider risk reducing agents (i.e. Tamoxifen)  Individualized  N/A    *age to begin screening is based on the onset of breast cancer in Ms. Carroll's family    2) Ms. Ramy Banuelos should continue general population cancer screening guidelines as directed by her physicians. RECOMMENDATIONS FOR FAMILY MEMBERS   1) Genetic testing is not recommended for Ms. Carroll's children based on her negative test results. However, this recommendation does not take into consideration any family history of cancer in their paternal family. 2) It is possible that the cancers in Ms. Carroll's paternal family are due to a hereditary gene mutation that she did not inherit. Therefore, her paternal relatives may consider genetic counseling and testing to clarify this possibility. Relatives may contact the [address] [place] [address] [employer] at [phone] or locate a genetic counselor at [email or web address].     3) We encourage Ms. Carroll's relatives to discuss their family history of cancer with

## 2020-09-30 NOTE — TELEPHONE ENCOUNTER
Left message for Maureen Burns notifying her that her genetic test results are available. Requested that she return my phone call at her earliest convenience to review results.

## 2021-01-12 DIAGNOSIS — B85.0 HEAD LICE: Primary | ICD-10-CM

## 2021-01-12 RX ORDER — PERMETHRIN 50 MG/G
CREAM TOPICAL
Qty: 1 TUBE | Refills: 0 | Status: SHIPPED | OUTPATIENT
Start: 2021-01-12

## 2021-01-14 RX ORDER — PIPERONYL BUTOXIDE AND PYRETHRUM EXTRACT 40; 3.3 MG/ML; MG/ML
SHAMPOO TOPICAL
Qty: 1 BOTTLE | Refills: 1 | Status: SHIPPED | OUTPATIENT
Start: 2021-01-14 | End: 2021-01-16

## 2021-03-16 DIAGNOSIS — Z12.39 BREAST CANCER SCREENING, HIGH RISK PATIENT: ICD-10-CM

## 2021-03-16 DIAGNOSIS — Z80.3 FAMILY HISTORY OF BREAST CANCER: Primary | ICD-10-CM

## 2021-07-01 ENCOUNTER — TELEPHONE (OUTPATIENT)
Dept: OBGYN | Age: 51
End: 2021-07-01

## 2021-08-27 ENCOUNTER — HOSPITAL ENCOUNTER (EMERGENCY)
Age: 51
Discharge: LEFT BEFORE DISCHARGE | End: 2021-08-28
Payer: COMMERCIAL

## 2021-08-28 ENCOUNTER — APPOINTMENT (OUTPATIENT)
Dept: CT IMAGING | Age: 51
End: 2021-08-28
Payer: COMMERCIAL

## 2021-08-28 ENCOUNTER — HOSPITAL ENCOUNTER (EMERGENCY)
Age: 51
Discharge: HOME | End: 2021-08-28
Attending: EMERGENCY MEDICINE
Payer: COMMERCIAL

## 2021-08-28 VITALS
HEART RATE: 85 BPM | BODY MASS INDEX: 30.73 KG/M2 | OXYGEN SATURATION: 99 % | RESPIRATION RATE: 20 BRPM | TEMPERATURE: 97.9 F | SYSTOLIC BLOOD PRESSURE: 115 MMHG | DIASTOLIC BLOOD PRESSURE: 68 MMHG | WEIGHT: 180 LBS | HEIGHT: 64 IN

## 2021-08-28 DIAGNOSIS — S16.1XXA ACUTE STRAIN OF NECK MUSCLE, INITIAL ENCOUNTER: ICD-10-CM

## 2021-08-28 DIAGNOSIS — S09.90XA CLOSED HEAD INJURY, INITIAL ENCOUNTER: Primary | ICD-10-CM

## 2021-08-28 PROCEDURE — 96372 THER/PROPH/DIAG INJ SC/IM: CPT

## 2021-08-28 PROCEDURE — 99283 EMERGENCY DEPT VISIT LOW MDM: CPT

## 2021-08-28 PROCEDURE — 72125 CT NECK SPINE W/O DYE: CPT

## 2021-08-28 PROCEDURE — 6360000002 HC RX W HCPCS: Performed by: EMERGENCY MEDICINE

## 2021-08-28 PROCEDURE — 70450 CT HEAD/BRAIN W/O DYE: CPT

## 2021-08-28 RX ORDER — CYCLOBENZAPRINE HCL 10 MG
10 TABLET ORAL 3 TIMES DAILY PRN
Qty: 20 TABLET | Refills: 0 | Status: SHIPPED | OUTPATIENT
Start: 2021-08-28 | End: 2021-09-07

## 2021-08-28 RX ORDER — HYDROCODONE BITARTRATE AND ACETAMINOPHEN 5; 325 MG/1; MG/1
1 TABLET ORAL EVERY 6 HOURS PRN
Qty: 10 TABLET | Refills: 0 | Status: SHIPPED | OUTPATIENT
Start: 2021-08-28 | End: 2021-08-31

## 2021-08-28 RX ORDER — ORPHENADRINE CITRATE 30 MG/ML
60 INJECTION INTRAMUSCULAR; INTRAVENOUS ONCE
Status: COMPLETED | OUTPATIENT
Start: 2021-08-28 | End: 2021-08-28

## 2021-08-28 RX ORDER — KETOROLAC TROMETHAMINE 30 MG/ML
30 INJECTION, SOLUTION INTRAMUSCULAR; INTRAVENOUS ONCE
Status: COMPLETED | OUTPATIENT
Start: 2021-08-28 | End: 2021-08-28

## 2021-08-28 RX ORDER — IBUPROFEN 800 MG/1
800 TABLET ORAL EVERY 8 HOURS PRN
Qty: 30 TABLET | Refills: 0 | Status: SHIPPED | OUTPATIENT
Start: 2021-08-28

## 2021-08-28 RX ADMIN — KETOROLAC TROMETHAMINE 30 MG: 30 INJECTION, SOLUTION INTRAMUSCULAR; INTRAVENOUS at 12:51

## 2021-08-28 RX ADMIN — ORPHENADRINE CITRATE 60 MG: 60 INJECTION INTRAMUSCULAR; INTRAVENOUS at 11:27

## 2021-08-28 ASSESSMENT — ENCOUNTER SYMPTOMS
FACIAL SWELLING: 0
EYE DISCHARGE: 0
WHEEZING: 0
SINUS PRESSURE: 0
DIARRHEA: 0
COLOR CHANGE: 0
RHINORRHEA: 0
SHORTNESS OF BREATH: 0
BACK PAIN: 0
ABDOMINAL PAIN: 0
BLOOD IN STOOL: 0
EYE PAIN: 0
EYE REDNESS: 0
SORE THROAT: 0
VOMITING: 0
CHEST TIGHTNESS: 0
TROUBLE SWALLOWING: 0
NAUSEA: 0
CONSTIPATION: 0
COUGH: 0

## 2021-08-28 ASSESSMENT — PAIN SCALES - GENERAL
PAINLEVEL_OUTOF10: 10
PAINLEVEL_OUTOF10: 10

## 2021-08-28 ASSESSMENT — PAIN DESCRIPTION - PAIN TYPE: TYPE: ACUTE PAIN

## 2021-08-28 ASSESSMENT — PAIN DESCRIPTION - LOCATION: LOCATION: HEAD;NECK;ARM

## 2021-08-28 ASSESSMENT — PAIN DESCRIPTION - ORIENTATION: ORIENTATION: LEFT

## 2021-08-28 NOTE — ED PROVIDER NOTES
[address]  eMERGENCY dEPARTMENT eNCOUnter      Pt Name: Joe Canada  MRN: [id number]  [place] 1970  Date of evaluation: 8/28/21      CHIEF COMPLAINT       Chief Complaint   Patient presents with    Headache     20+ hours, taken many extra strength tylenol    Neck Pain    Shoulder Injury     pain, numbness and tingling down to palm of hand (left)         HISTORY OF PRESENT ILLNESS    Joe Canada is a 46 y.o. female who presents complaining of head injury. Patient states yesterday afternoon her ceiling collapsed and hit her on the top of the head and knocked her over into her table. Patient states she did not lose consciousness but she was very dazed and confused. Patient states since that time she is been having a severe headache on the right side of her head and the left side of her neck going into her shoulder. Patient states she has pain numbness and tingling going down the arm all the way to the hand. Patient is able to move everything just it does cause some pain to her neck. Patient has no nausea or vomiting. Patient is not on blood thinners. Pain is severe. Patient is tried Tylenol without any relief. REVIEW OF SYSTEMS       Review of Systems   Constitutional: Negative for activity change, appetite change, chills, diaphoresis and fever. HENT: Negative for congestion, ear pain, facial swelling, nosebleeds, rhinorrhea, sinus pressure, sore throat and trouble swallowing. Eyes: Negative for pain, discharge and redness. Respiratory: Negative for cough, chest tightness, shortness of breath and wheezing. Cardiovascular: Negative for chest pain, palpitations and leg swelling. Gastrointestinal: Negative for abdominal pain, blood in stool, constipation, diarrhea, nausea and vomiting. Genitourinary: Negative for difficulty urinating, dysuria, flank pain, frequency, genital sores and hematuria. Musculoskeletal: Positive for neck pain.  Negative for arthralgias, back pain, gait problem, joint swelling and myalgias. Skin: Negative for color change, pallor, rash and wound. Neurological: Positive for numbness and headaches. Negative for dizziness, tremors, seizures, syncope, speech difficulty and weakness. Psychiatric/Behavioral: Negative for confusion, decreased concentration, hallucinations, self-injury, sleep disturbance and suicidal ideas. PAST MEDICAL HISTORY     Past Medical History:   Diagnosis Date    Allergic rhinitis     Asthma     Chronic back pain     on 8/10/18 pt denies pain mgmnt    Fibromyalgia     Lumbar radiculopathy     Osteoarthritis        SURGICAL HISTORY       Past Surgical History:   Procedure Laterality Date     SECTION      DILATION AND CURETTAGE OF UTERUS      SAB       CURRENT MEDICATIONS       Previous Medications    ACETAMINOPHEN (APAP EXTRA STRENGTH) 500 MG TABLET    Take 2 tablets by mouth every 6 hours as needed for Pain    LIDOCAINE HCL (ASPERCREME W/LIDOCAINE) 4 % CREA    Apply topically to affected areas 3 times daily as needed. PAROXETINE (PAXIL) 10 MG TABLET    Take 1 tablet by mouth daily    PERMETHRIN (ELIMITE) 5 % CREAM    Apply topically as directed       ALLERGIES     has No Known Allergies. SOCIAL HISTORY      reports that she has been smoking cigarettes. She has a 30.00 pack-year smoking history. She has never used smokeless tobacco. She reports previous drug use. She reports that she does not drink alcohol. PHYSICAL EXAM     INITIAL VITALS: /68   Pulse 85   Temp 97.9 °F (36.6 °C) (Oral)   Resp 20   Ht 5' 3.5\" (1.613 m)   Wt 180 lb (81.6 kg)   LMP 2020 (Approximate)   SpO2 99%   BMI 31.39 kg/m²      Physical Exam  Vitals and nursing note reviewed. Constitutional:       General: She is not in acute distress. Appearance: She is well-developed. She is not diaphoretic. HENT:      Head: Normocephalic and atraumatic. Eyes:      General: No scleral icterus.         Right eye: No discharge. Left eye: No discharge. Conjunctiva/sclera: Conjunctivae normal.      Pupils: Pupils are equal, round, and reactive to light. Cardiovascular:      Rate and Rhythm: Normal rate and regular rhythm. Heart sounds: Normal heart sounds. No murmur heard. No friction rub. No gallop. Pulmonary:      Effort: Pulmonary effort is normal. No respiratory distress. Breath sounds: Normal breath sounds. No wheezing or rales. Chest:      Chest wall: No tenderness. Abdominal:      General: Bowel sounds are normal. There is no distension. Palpations: Abdomen is soft. There is no mass. Tenderness: There is no abdominal tenderness. There is no guarding or rebound. Musculoskeletal:      Cervical back: Signs of trauma, tenderness and bony tenderness present. No swelling, edema, deformity, erythema, lacerations, rigidity, spasms or torticollis. Pain with movement present. Decreased range of motion. Skin:     General: Skin is warm and dry. Coloration: Skin is not pale. Findings: No erythema or rash. Neurological:      Mental Status: She is alert and oriented to person, place, and time. Cranial Nerves: No cranial nerve deficit. Sensory: No sensory deficit. Motor: No abnormal muscle tone. Coordination: Coordination normal.      Deep Tendon Reflexes: Reflexes normal.   Psychiatric:         Behavior: Behavior normal.         Thought Content: Thought content normal.         Judgment: Judgment normal.         DIAGNOSTIC RESULTS     RADIOLOGY:All plain film, CT,MRI, and formal ultrasound images (except ED bedside ultrasound) are read by the radiologist and the interpretations are directly viewed by the emergency physician. CT HEAD WO CONTRAST    Result Date: 8/28/2021  CT head: No acute intracranial abnormality evident by CT. CT cervical spine: 1. Mild degenerative changes in the mid cervical spine. Mild reversal of the cervical lordosis.  2. No clear evidence for acute vertebral body height loss or fracture within the cervical spine. CT CERVICAL SPINE WO CONTRAST    Result Date: 8/28/2021  CT head: No acute intracranial abnormality evident by CT. CT cervical spine: 1. Mild degenerative changes in the mid cervical spine. Mild reversal of the cervical lordosis. 2. No clear evidence for acute vertebral body height loss or fracture within the cervical spine. LABS: All lab results were reviewed by myself, and all abnormals are listed below. Labs Reviewed - No data to display      MEDICAL DECISION MAKING:     Most likely I think the patient probably has some contusions neck strain and may be a concussion but when to get a CT scan to rule out head bleed or any kind of fractures in the neck. EMERGENCY DEPARTMENT COURSE:   Vitals:    Vitals:    08/28/21 1042   BP: 115/68   Pulse: 85   Resp: 20   Temp: 97.9 °F (36.6 °C)   TempSrc: Oral   SpO2: 99%   Weight: 180 lb (81.6 kg)   Height: 5' 3.5\" (1.613 m)       The patient was given the following medications while in the emergency department:  Orders Placed This Encounter   Medications    orphenadrine (NORFLEX) injection 60 mg    ketorolac (TORADOL) injection 30 mg    ibuprofen (ADVIL;MOTRIN) 800 MG tablet     Sig: Take 1 tablet by mouth every 8 hours as needed for Pain     Dispense:  30 tablet     Refill:  0    cyclobenzaprine (FLEXERIL) 10 MG tablet     Sig: Take 1 tablet by mouth 3 times daily as needed for Muscle spasms     Dispense:  20 tablet     Refill:  0    HYDROcodone-acetaminophen (NORCO) 5-325 MG per tablet     Sig: Take 1 tablet by mouth every 6 hours as needed for Pain for up to 3 days. Dispense:  10 tablet     Refill:  0       -------------------------  12:49 PM EDT  Patient was updated on results and plan of care.   Patient is not showing any hard neurologic signs negative CT scans therefore she is okay to go home with muscle relaxers and some pain medication and anti-inflammatories. CONSULTS:  None    PROCEDURES:  None    FINAL IMPRESSION      1. Closed head injury, initial encounter    2. Acute strain of neck muscle, initial encounter          DISPOSITION/PLAN   DISPOSITION Decision To Discharge 08/28/2021 12:48:12 PM      PATIENT REFERREDTO:  Saud Samson MD  [address] [facility]  [phone]    In 1 week      [facility] ED  [facility] [address] [phone]    If symptoms worsen      DISCHARGEMEDICATIONS:  New Prescriptions    CYCLOBENZAPRINE (FLEXERIL) 10 MG TABLET    Take 1 tablet by mouth 3 times daily as needed for Muscle spasms    HYDROCODONE-ACETAMINOPHEN (NORCO) 5-325 MG PER TABLET    Take 1 tablet by mouth every 6 hours as needed for Pain for up to 3 days.        (Please note that portions of this note were completed with a voice recognition program.  Efforts were made to edit thedictations but occasionally words are mis-transcribed.)    Leeann Fuentes MD  Attending Emergency Physician                        Leeann Fuentes MD  08/28/21 3921

## 2021-08-28 NOTE — LETTER
[facility] ED  [address] [facility] [facility] [address]  Phone: [phone]             August 28, 2021    Patient: Amparo Osman   YOB: 1970   Date of Visit: 8/28/2021       To Whom It May Concern:    Gregoria Pryor was seen and treated in our emergency department on 8/28/2021. She may return to work on 8/30/2021.       Sincerely,             Signature:__________________________________

## 2021-08-28 NOTE — ED NOTES
Headache and neck pain since yesterday after her ceiling collapsed on her head. Denies loss consciousness. Numbness and tingling in left arm and hand.        Ramy Martel RN  08/28/21 [address], RN  08/28/21 0994

## 2021-08-31 ENCOUNTER — HOSPITAL ENCOUNTER (EMERGENCY)
Age: 51
Discharge: HOME | End: 2021-08-31
Attending: STUDENT IN AN ORGANIZED HEALTH CARE EDUCATION/TRAINING PROGRAM
Payer: COMMERCIAL

## 2021-08-31 ENCOUNTER — TELEPHONE (OUTPATIENT)
Dept: FAMILY MEDICINE CLINIC | Age: 51
End: 2021-08-31

## 2021-08-31 VITALS
HEART RATE: 71 BPM | DIASTOLIC BLOOD PRESSURE: 84 MMHG | TEMPERATURE: 98.1 F | RESPIRATION RATE: 18 BRPM | WEIGHT: 175 LBS | HEIGHT: 64 IN | SYSTOLIC BLOOD PRESSURE: 152 MMHG | BODY MASS INDEX: 29.88 KG/M2 | OXYGEN SATURATION: 100 %

## 2021-08-31 DIAGNOSIS — S16.1XXA ACUTE STRAIN OF NECK MUSCLE, INITIAL ENCOUNTER: Primary | ICD-10-CM

## 2021-08-31 DIAGNOSIS — S09.90XA CLOSED HEAD INJURY, INITIAL ENCOUNTER: ICD-10-CM

## 2021-08-31 PROCEDURE — 6360000002 HC RX W HCPCS: Performed by: STUDENT IN AN ORGANIZED HEALTH CARE EDUCATION/TRAINING PROGRAM

## 2021-08-31 PROCEDURE — 99283 EMERGENCY DEPT VISIT LOW MDM: CPT

## 2021-08-31 PROCEDURE — 96372 THER/PROPH/DIAG INJ SC/IM: CPT

## 2021-08-31 RX ORDER — METOCLOPRAMIDE HYDROCHLORIDE 5 MG/ML
10 INJECTION INTRAMUSCULAR; INTRAVENOUS ONCE
Status: COMPLETED | OUTPATIENT
Start: 2021-08-31 | End: 2021-08-31

## 2021-08-31 RX ORDER — LIDOCAINE HCL 4% 4 G/100G
CREAM TOPICAL
Qty: 49 G | Refills: 0 | Status: SHIPPED | OUTPATIENT
Start: 2021-08-31

## 2021-08-31 RX ORDER — DIPHENHYDRAMINE HYDROCHLORIDE 50 MG/ML
25 INJECTION INTRAMUSCULAR; INTRAVENOUS ONCE
Status: COMPLETED | OUTPATIENT
Start: 2021-08-31 | End: 2021-08-31

## 2021-08-31 RX ORDER — KETOROLAC TROMETHAMINE 30 MG/ML
30 INJECTION, SOLUTION INTRAMUSCULAR; INTRAVENOUS ONCE
Status: COMPLETED | OUTPATIENT
Start: 2021-08-31 | End: 2021-08-31

## 2021-08-31 RX ADMIN — KETOROLAC TROMETHAMINE 30 MG: 30 INJECTION, SOLUTION INTRAMUSCULAR; INTRAVENOUS at 04:32

## 2021-08-31 RX ADMIN — DIPHENHYDRAMINE HYDROCHLORIDE 25 MG: 50 INJECTION, SOLUTION INTRAMUSCULAR; INTRAVENOUS at 04:32

## 2021-08-31 RX ADMIN — METOCLOPRAMIDE 10 MG: 5 INJECTION, SOLUTION INTRAMUSCULAR; INTRAVENOUS at 04:33

## 2021-08-31 ASSESSMENT — ENCOUNTER SYMPTOMS
EYE DISCHARGE: 0
EYE REDNESS: 0
ABDOMINAL PAIN: 0
SHORTNESS OF BREATH: 0

## 2021-08-31 ASSESSMENT — PAIN DESCRIPTION - PAIN TYPE: TYPE: ACUTE PAIN

## 2021-08-31 ASSESSMENT — PAIN SCALES - GENERAL
PAINLEVEL_OUTOF10: 10
PAINLEVEL_OUTOF10: 10

## 2021-08-31 NOTE — ED PROVIDER NOTES
[facility]  Emergency Department Encounter     Pt Name: Freddie Packer  MRN: [id number]  [place] 1970  Date of evaluation: 21  PCP:  Pino Keen MD    [address] [place] [address]       Chief Complaint   Patient presents with    Headache     coming up neck, started Friday, evaluated on Sat for same complaint    Neck Pain       HISTORY [facility]  (Location/Symptom, Timing/Onset, Context/Setting, Quality, Duration, Modifying Factors,Severity.)      Freddie Packer is a 46 y.o. female who presents with neck pain and headache. She states that part of her ceiling fell and her bookshelf fell striking her in the head approximately 4 days ago. Was seen and evaluated at [facility]. CT imaging of head and neck performed which showed no acute findings. Was discharged on symptomatic treatment as well as short course of [place].  States she has continued to have left base of skull pain worse with turning neck. Worse with palpation radiating to entire left and right head. Pain is severe. Left base of skull and head. Throbbing. Constant. PAST MEDICAL / SURGICAL / SOCIAL / FAMILY HISTORY      has a past medical history of Allergic rhinitis, Asthma, Chronic back pain, Fibromyalgia, Lumbar radiculopathy, and Osteoarthritis. has a past surgical history that includes Dilation and curettage of uterus and  section.     Social History     Socioeconomic History    Marital status: Single     Spouse name: Not on file    Number of children: Not on file    Years of education: Not on file    Highest education level: Not on file   Occupational History    Not on file   Tobacco Use    Smoking status: Current Every Day Smoker     Packs/day: 1.00     Years: 30.00     Pack years: 30.00     Types: Cigarettes    Smokeless tobacco: Never Used   Vaping Use    Vaping Use: Former   Substance and Sexual Activity    Alcohol use: No    Drug use: Not Currently     Comment: Edgar  2 x week    Sexual activity: Yes     Partners: Male   Other Topics Concern    Not on file   Social History Narrative    Not on file     Social Determinants of Health     Financial Resource Strain:     Difficulty of Paying Living Expenses:    Food Insecurity:     Worried About Running Out of Food in the Last Year:     [address] in the Last Year:    Transportation Needs:     Lack of Transportation (Medical):  Lack of Transportation (Non-Medical):    Physical Activity:     Days of Exercise per Week:     Minutes of Exercise per Session:    Stress:     Feeling of Stress :    Social Connections:     Frequency of Communication with Friends and Family:     Frequency of Social Gatherings with Friends and Family:     Attends Hindu Services:     Active Member of Clubs or Organizations:     Attends Club or Organization Meetings:     Marital Status:    Intimate Partner Violence:     Fear of Current or Ex-Partner:     Emotionally Abused:     Physically Abused:     Sexually Abused:        Family History   Problem Relation Age of Onset    Breast Cancer Paternal Grandmother         unknown age dx    Breast Cancer Paternal Aunt         unknown age dx   [facility] Uterine Cancer Neg Hx     Colon Cancer Neg Hx     Ovarian Cancer Neg Hx        Allergies:  Patient has no known allergies. Home Medications:  Prior to Admission medications    Medication Sig Start Date End Date Taking? Authorizing Provider   Lidocaine HCl (ASPERCREME W/LIDOCAINE) 4 % CREA Apply topically to affected areas 3 times daily as needed.  8/31/21  Yes Carina Haq,    ibuprofen (ADVIL;MOTRIN) 800 MG tablet Take 1 tablet by mouth every 8 hours as needed for Pain 8/28/21   Chito Gonzales MD   cyclobenzaprine (FLEXERIL) 10 MG tablet Take 1 tablet by mouth 3 times daily as needed for Muscle spasms 8/28/21 9/7/21  Chito Gonzales MD   HYDROcodone-acetaminophen (NORCO) 5-325 MG per tablet Take 1 tablet by mouth every 6 hours as needed for Pain for up to 3 days. 8/28/21 8/31/21  Jayla Taylor MD   permethrin (ELIMITE) 5 % cream Apply topically as directed 1/12/21   Ramin Rios MD   PARoxetine (PAXIL) 10 MG tablet Take 1 tablet by mouth daily  Patient not taking: Reported on 9/18/2020 6/2/20   Trav Linares, DO   acetaminophen (APAP EXTRA STRENGTH) 500 MG tablet Take 2 tablets by mouth every 6 hours as needed for Pain 5/1/20   Remberto Barrett DO       REVIEW OF SYSTEMS    (2-9 systems for level 4, 10 or more for level 5)      Review of Systems   Constitutional: Negative for chills and fever. Eyes: Negative for discharge and redness. Respiratory: Negative for shortness of breath. Cardiovascular: Negative for chest pain. Gastrointestinal: Negative for abdominal pain. Genitourinary: Negative for flank pain. Musculoskeletal: Positive for neck pain. Skin: Negative for rash. Allergic/Immunologic: Negative for environmental allergies. Neurological: Positive for headaches. Negative for weakness and numbness. Psychiatric/Behavioral: Negative for agitation and confusion. PHYSICAL EXAM   (up to 7 for level 4, 8 or more for level 5)     INITIAL VITALS:    height is 5' 4\" (1.626 m) and weight is 175 lb (79.4 kg). Her oral temperature is 98.1 °F (36.7 °C). Her blood pressure is 152/84 (abnormal) and her pulse is 71. Her respiration is 18 and oxygen saturation is 100%. Physical Exam  Vitals and nursing note reviewed. Constitutional:       Appearance: She is well-developed. HENT:      Head: Normocephalic and atraumatic. Right Ear: Tympanic membrane normal.      Left Ear: Tympanic membrane normal.      Ears:      Comments: No hemotympanum or smith sign     Nose: Nose normal.      Mouth/Throat:      Mouth: Mucous membranes are moist.   Eyes:      General: No scleral icterus. Conjunctiva/sclera: Conjunctivae normal.      Pupils: Pupils are equal, round, and reactive to light.    Cardiovascular:      Rate and Rhythm: Normal rate and regular rhythm. Heart sounds: Normal heart sounds. No murmur heard. No friction rub. No gallop. Pulmonary:      Effort: Pulmonary effort is normal. No respiratory distress. Breath sounds: Normal breath sounds. No wheezing or rales. Abdominal:      Palpations: Abdomen is soft. Tenderness: There is no abdominal tenderness. Musculoskeletal:         General: Normal range of motion. Comments: Tender to palpation over left paraspinal left trapezius   Skin:     General: Skin is warm and dry. Findings: No erythema or rash. Neurological:      Mental Status: She is alert and oriented to person, place, and time. Comments: No facial asymmetry, tongue midline on protrusion, no aphasia, no dysarthria, EOMI, PERRLA; 5/5 strength in upper and lower extremities b/l; no changes in sensation; finger to nose normal     Psychiatric:         Behavior: Behavior normal.         DIFFERENTIAL  DIAGNOSIS     PLAN (LABS / IMAGING / EKG):  No orders of the defined types were placed in this encounter. MEDICATIONS ORDERED:  Orders Placed This Encounter   Medications    ketorolac (TORADOL) injection 30 mg    diphenhydrAMINE (BENADRYL) injection 25 mg    metoclopramide (REGLAN) injection 10 mg    Lidocaine HCl (ASPERCREME W/LIDOCAINE) 4 % CREA     Sig: Apply topically to affected areas 3 times daily as needed. Dispense:  49 g     Refill:  0       DDX: Cervical sprain versus fracture versus closed head injury versus vertebral artery dissection versus blunt vascular injury    Initial MDM/Plan: 46 y.o. female who presents with reported trauma to head 4 days ago. CT imaging reviewed all negative from 3 days ago. Most likely musculoskeletal pain. Low suspicion for any vascular injury or significant intracranial injury with normal neurologic exam.  Will treat for migraine as well as a lidocaine gel.   Encourage patient to follow-up with primary care doctor return for worsening signs or symptoms. DIAGNOSTIC RESULTS / EMERGENCY DEPARTMENT COURSE / MDM     LABS:  Labs Reviewed - No data to display      RADIOLOGY:  No results found. EMERGENCY DEPARTMENT COURSE:  ED Course as of Aug 31 1622   Tue Aug 31, 2021   2365 Negative CT brain and C-spine 2 days ago. Worsening neck pain. Will treat as migraine Toradol. Discharge. [MS]      ED Course User Index  [MS] Briana Gerardo DO         · Based on the low acuity of concerning symptoms and improvement of symptoms, patient will be discharged with follow up and prescription information listed in the Disposition section. · Patient states they will follow-up with primary care physician and/or return to the emergency department should they experience a change or worsening of symptoms. · Patient will be discharged with resources: summary of visit, instructions, follow-up information, prescriptions if necessary. · Patient/ family instructed to read discharge paperwork. All of their questions and concerns were addressed. · Suspicion for any acute life-threatening processes is low. Patient voices understanding of plan. PROCEDURES:  None    CONSULTS:  None    CRITICAL CARE:  0    FINAL IMPRESSION      1. Acute strain of neck muscle, initial encounter    2.  Closed head injury, initial encounter          DISPOSITION / PLAN     DISPOSITION Decision To Discharge 08/31/2021 04:30:18 AM        PATIENTREFERRED TO:  Nasrin Santana MD  [address]  [phone]    Schedule an appointment as soon as possible for a visit         DISCHARGE MEDICATIONS:  Discharge Medication List as of 8/31/2021  4:41 AM          Briana Gerardo DO  EmergencyMedicine Attending    (Please note that portions of this note were completed with a voice recognition program.  Efforts were made to edit the dictations but occasionally words are mis-transcribed.)        Briana Gerardo DO  08/31/21 1622       Briana Gerardo DO  08/31/21

## 2021-08-31 NOTE — TELEPHONE ENCOUNTER
Attempt to schedule [facility] ED F/U- left v-message to call office for appointment, pt treated on 8/31/2021.

## 2021-09-03 ENCOUNTER — APPOINTMENT (OUTPATIENT)
Dept: GENERAL RADIOLOGY | Age: 51
End: 2021-09-03
Payer: COMMERCIAL

## 2021-09-03 ENCOUNTER — NURSE TRIAGE (OUTPATIENT)
Dept: OTHER | Facility: CLINIC | Age: 51
End: 2021-09-03

## 2021-09-03 ENCOUNTER — APPOINTMENT (OUTPATIENT)
Dept: CT IMAGING | Age: 51
End: 2021-09-03
Payer: COMMERCIAL

## 2021-09-03 ENCOUNTER — HOSPITAL ENCOUNTER (OUTPATIENT)
Age: 51
Setting detail: OBSERVATION
Discharge: HOME | End: 2021-09-04
Attending: EMERGENCY MEDICINE | Admitting: PSYCHIATRY & NEUROLOGY
Payer: COMMERCIAL

## 2021-09-03 DIAGNOSIS — R29.90 STROKE-LIKE SYMPTOMS: Primary | ICD-10-CM

## 2021-09-03 PROBLEM — G44.309 POST-CONCUSSION HEADACHE: Status: ACTIVE | Noted: 2021-09-03

## 2021-09-03 LAB
% CKMB: 1.2 % (ref 0–3)
ABSOLUTE EOS #: 0.14 K/UL (ref 0–0.44)
ABSOLUTE IMMATURE GRANULOCYTE: <0.03 K/UL (ref 0–0.3)
ABSOLUTE LYMPH #: 2.3 K/UL (ref 1.1–3.7)
ABSOLUTE MONO #: 0.48 K/UL (ref 0.1–1.2)
ALLEN TEST: ABNORMAL
ANION GAP SERPL CALCULATED.3IONS-SCNC: 14 MMOL/L (ref 9–17)
ANION GAP: 8 MMOL/L (ref 7–16)
BASOPHILS # BLD: 1 % (ref 0–2)
BASOPHILS ABSOLUTE: 0.05 K/UL (ref 0–0.2)
BUN BLDV-MCNC: 16 MG/DL (ref 6–20)
BUN/CREAT BLD: ABNORMAL (ref 9–20)
CALCIUM SERPL-MCNC: 9.4 MG/DL (ref 8.6–10.4)
CHLORIDE BLD-SCNC: 107 MMOL/L (ref 98–107)
CK MB: 1.6 NG/ML
CKMB INTERPRETATION: ABNORMAL
CO2: 23 MMOL/L (ref 20–31)
CREAT SERPL-MCNC: 0.77 MG/DL (ref 0.5–0.9)
DIFFERENTIAL TYPE: ABNORMAL
EOSINOPHILS RELATIVE PERCENT: 2 % (ref 1–4)
FIO2: ABNORMAL
GFR AFRICAN AMERICAN: >60 ML/MIN
GFR NON-AFRICAN AMERICAN: >60 ML/MIN
GFR NON-AFRICAN AMERICAN: >60 ML/MIN
GFR SERPL CREATININE-BSD FRML MDRD: >60 ML/MIN
GFR SERPL CREATININE-BSD FRML MDRD: ABNORMAL ML/MIN/{1.73_M2}
GFR SERPL CREATININE-BSD FRML MDRD: ABNORMAL ML/MIN/{1.73_M2}
GFR SERPL CREATININE-BSD FRML MDRD: NORMAL ML/MIN/{1.73_M2}
GLUCOSE BLD-MCNC: 100 MG/DL (ref 70–99)
GLUCOSE BLD-MCNC: 98 MG/DL (ref 74–100)
HCO3 VENOUS: 29.8 MMOL/L (ref 22–29)
HCT VFR BLD CALC: 45.7 % (ref 36.3–47.1)
HEMOGLOBIN: 14.7 G/DL (ref 11.9–15.1)
IMMATURE GRANULOCYTES: 0 %
INR BLD: 0.9
LYMPHOCYTES # BLD: 39 % (ref 24–43)
MCH RBC QN AUTO: 29.8 PG (ref 25.2–33.5)
MCHC RBC AUTO-ENTMCNC: 32.2 G/DL (ref 28.4–34.8)
MCV RBC AUTO: 92.5 FL (ref 82.6–102.9)
MODE: ABNORMAL
MONOCYTES # BLD: 8 % (ref 3–12)
MYOGLOBIN: <21 NG/ML (ref 25–58)
NEGATIVE BASE EXCESS, VEN: ABNORMAL (ref 0–2)
NRBC AUTOMATED: 0 PER 100 WBC
O2 DEVICE/FLOW/%: ABNORMAL
O2 SAT, VEN: 41 % (ref 60–85)
PARTIAL THROMBOPLASTIN TIME: 23 SEC (ref 20.5–30.5)
PATIENT TEMP: ABNORMAL
PCO2, VEN: 56.9 MM HG (ref 41–51)
PDW BLD-RTO: 13.1 % (ref 11.8–14.4)
PH VENOUS: 7.33 (ref 7.32–7.43)
PLATELET # BLD: 287 K/UL (ref 138–453)
PLATELET ESTIMATE: ABNORMAL
PMV BLD AUTO: 9.2 FL (ref 8.1–13.5)
PO2, VEN: 25.7 MM HG (ref 30–50)
POC BUN: NORMAL MG/DL (ref 8–26)
POC CHLORIDE: 111 MMOL/L (ref 98–107)
POC CREATININE: 0.63 MG/DL (ref 0.51–1.19)
POC HEMATOCRIT: 47 % (ref 36–46)
POC HEMOGLOBIN: 16.1 G/DL (ref 12–16)
POC IONIZED CALCIUM: 1.25 MMOL/L (ref 1.15–1.33)
POC LACTIC ACID: 1.16 MMOL/L (ref 0.56–1.39)
POC PCO2 TEMP: ABNORMAL MM HG
POC PH TEMP: ABNORMAL
POC PO2 TEMP: ABNORMAL MM HG
POC POTASSIUM: 4.1 MMOL/L (ref 3.5–4.5)
POC SODIUM: 148 MMOL/L (ref 138–146)
POC TCO2: 30 MMOL/L (ref 22–30)
POSITIVE BASE EXCESS, VEN: 2 (ref 0–3)
POTASSIUM SERPL-SCNC: 4.2 MMOL/L (ref 3.7–5.3)
PROTHROMBIN TIME: 10.1 SEC (ref 9.1–12.3)
RBC # BLD: 4.94 M/UL (ref 3.95–5.11)
RBC # BLD: ABNORMAL 10*6/UL
SAMPLE SITE: ABNORMAL
SARS-COV-2, RAPID: NOT DETECTED
SEG NEUTROPHILS: 50 % (ref 36–65)
SEGMENTED NEUTROPHILS ABSOLUTE COUNT: 2.95 K/UL (ref 1.5–8.1)
SODIUM BLD-SCNC: 144 MMOL/L (ref 135–144)
SPECIMEN DESCRIPTION: NORMAL
TOTAL CK: 129 U/L (ref 26–192)
TOTAL CO2, VENOUS: ABNORMAL MMOL/L (ref 23–30)
TROPONIN INTERP: ABNORMAL
TROPONIN T: ABNORMAL NG/ML
TROPONIN, HIGH SENSITIVITY: <6 NG/L (ref 0–14)
TSH SERPL DL<=0.05 MIU/L-ACNC: 5.44 MIU/L (ref 0.3–5)
WBC # BLD: 5.9 K/UL (ref 3.5–11.3)
WBC # BLD: ABNORMAL 10*3/UL

## 2021-09-03 PROCEDURE — 84484 ASSAY OF TROPONIN QUANT: CPT

## 2021-09-03 PROCEDURE — 80051 ELECTROLYTE PANEL: CPT

## 2021-09-03 PROCEDURE — 82553 CREATINE MB FRACTION: CPT

## 2021-09-03 PROCEDURE — G0378 HOSPITAL OBSERVATION PER HR: HCPCS

## 2021-09-03 PROCEDURE — 83605 ASSAY OF LACTIC ACID: CPT

## 2021-09-03 PROCEDURE — 85730 THROMBOPLASTIN TIME PARTIAL: CPT

## 2021-09-03 PROCEDURE — 99204 OFFICE O/P NEW MOD 45 MIN: CPT | Performed by: PSYCHIATRY & NEUROLOGY

## 2021-09-03 PROCEDURE — 70498 CT ANGIOGRAPHY NECK: CPT

## 2021-09-03 PROCEDURE — 96376 TX/PRO/DX INJ SAME DRUG ADON: CPT

## 2021-09-03 PROCEDURE — 6370000000 HC RX 637 (ALT 250 FOR IP): Performed by: STUDENT IN AN ORGANIZED HEALTH CARE EDUCATION/TRAINING PROGRAM

## 2021-09-03 PROCEDURE — 72125 CT NECK SPINE W/O DYE: CPT

## 2021-09-03 PROCEDURE — 99244 OFF/OP CNSLTJ NEW/EST MOD 40: CPT | Performed by: PSYCHIATRY & NEUROLOGY

## 2021-09-03 PROCEDURE — 96375 TX/PRO/DX INJ NEW DRUG ADDON: CPT

## 2021-09-03 PROCEDURE — 36415 COLL VENOUS BLD VENIPUNCTURE: CPT

## 2021-09-03 PROCEDURE — 87635 SARS-COV-2 COVID-19 AMP PRB: CPT

## 2021-09-03 PROCEDURE — 85025 COMPLETE CBC W/AUTO DIFF WBC: CPT

## 2021-09-03 PROCEDURE — 2580000003 HC RX 258: Performed by: STUDENT IN AN ORGANIZED HEALTH CARE EDUCATION/TRAINING PROGRAM

## 2021-09-03 PROCEDURE — 83874 ASSAY OF MYOGLOBIN: CPT

## 2021-09-03 PROCEDURE — 70450 CT HEAD/BRAIN W/O DYE: CPT

## 2021-09-03 PROCEDURE — 82803 BLOOD GASES ANY COMBINATION: CPT

## 2021-09-03 PROCEDURE — 6360000004 HC RX CONTRAST MEDICATION: Performed by: STUDENT IN AN ORGANIZED HEALTH CARE EDUCATION/TRAINING PROGRAM

## 2021-09-03 PROCEDURE — 84443 ASSAY THYROID STIM HORMONE: CPT

## 2021-09-03 PROCEDURE — 82550 ASSAY OF CK (CPK): CPT

## 2021-09-03 PROCEDURE — 6360000002 HC RX W HCPCS: Performed by: STUDENT IN AN ORGANIZED HEALTH CARE EDUCATION/TRAINING PROGRAM

## 2021-09-03 PROCEDURE — 93005 ELECTROCARDIOGRAM TRACING: CPT | Performed by: STUDENT IN AN ORGANIZED HEALTH CARE EDUCATION/TRAINING PROGRAM

## 2021-09-03 PROCEDURE — 85610 PROTHROMBIN TIME: CPT

## 2021-09-03 PROCEDURE — 82947 ASSAY GLUCOSE BLOOD QUANT: CPT

## 2021-09-03 PROCEDURE — 82565 ASSAY OF CREATININE: CPT

## 2021-09-03 PROCEDURE — 82330 ASSAY OF CALCIUM: CPT

## 2021-09-03 PROCEDURE — 85014 HEMATOCRIT: CPT

## 2021-09-03 PROCEDURE — 99285 EMERGENCY DEPT VISIT HI MDM: CPT

## 2021-09-03 PROCEDURE — 71046 X-RAY EXAM CHEST 2 VIEWS: CPT

## 2021-09-03 PROCEDURE — 80048 BASIC METABOLIC PNL TOTAL CA: CPT

## 2021-09-03 PROCEDURE — 96374 THER/PROPH/DIAG INJ IV PUSH: CPT

## 2021-09-03 RX ORDER — DIPHENHYDRAMINE HYDROCHLORIDE 50 MG/ML
12.5 INJECTION INTRAMUSCULAR; INTRAVENOUS EVERY 6 HOURS PRN
Status: DISCONTINUED | OUTPATIENT
Start: 2021-09-03 | End: 2021-09-04 | Stop reason: HOSPADM

## 2021-09-03 RX ORDER — ACETAMINOPHEN 325 MG/1
650 TABLET ORAL EVERY 6 HOURS PRN
Status: DISCONTINUED | OUTPATIENT
Start: 2021-09-03 | End: 2021-09-04 | Stop reason: HOSPADM

## 2021-09-03 RX ORDER — MORPHINE SULFATE 4 MG/ML
4 INJECTION, SOLUTION INTRAMUSCULAR; INTRAVENOUS ONCE
Status: COMPLETED | OUTPATIENT
Start: 2021-09-03 | End: 2021-09-03

## 2021-09-03 RX ORDER — POLYETHYLENE GLYCOL 3350 17 G/17G
17 POWDER, FOR SOLUTION ORAL DAILY PRN
Status: DISCONTINUED | OUTPATIENT
Start: 2021-09-03 | End: 2021-09-04 | Stop reason: HOSPADM

## 2021-09-03 RX ORDER — PROCHLORPERAZINE EDISYLATE 5 MG/ML
10 INJECTION INTRAMUSCULAR; INTRAVENOUS ONCE
Status: COMPLETED | OUTPATIENT
Start: 2021-09-03 | End: 2021-09-03

## 2021-09-03 RX ORDER — ATORVASTATIN CALCIUM 80 MG/1
80 TABLET, FILM COATED ORAL NIGHTLY
Status: DISCONTINUED | OUTPATIENT
Start: 2021-09-03 | End: 2021-09-04 | Stop reason: HOSPADM

## 2021-09-03 RX ORDER — DIPHENHYDRAMINE HYDROCHLORIDE 50 MG/ML
25 INJECTION INTRAMUSCULAR; INTRAVENOUS ONCE
Status: COMPLETED | OUTPATIENT
Start: 2021-09-03 | End: 2021-09-03

## 2021-09-03 RX ORDER — PERMETHRIN 50 MG/G
CREAM TOPICAL DAILY
Status: DISCONTINUED | OUTPATIENT
Start: 2021-09-03 | End: 2021-09-03

## 2021-09-03 RX ORDER — AMITRIPTYLINE HYDROCHLORIDE 25 MG/1
25 TABLET, FILM COATED ORAL NIGHTLY
Status: DISCONTINUED | OUTPATIENT
Start: 2021-09-03 | End: 2021-09-04 | Stop reason: HOSPADM

## 2021-09-03 RX ORDER — SODIUM CHLORIDE 9 MG/ML
25 INJECTION, SOLUTION INTRAVENOUS PRN
Status: DISCONTINUED | OUTPATIENT
Start: 2021-09-03 | End: 2021-09-04 | Stop reason: HOSPADM

## 2021-09-03 RX ORDER — SODIUM CHLORIDE 0.9 % (FLUSH) 0.9 %
5-40 SYRINGE (ML) INJECTION PRN
Status: DISCONTINUED | OUTPATIENT
Start: 2021-09-03 | End: 2021-09-04 | Stop reason: HOSPADM

## 2021-09-03 RX ORDER — ONDANSETRON 2 MG/ML
4 INJECTION INTRAMUSCULAR; INTRAVENOUS EVERY 6 HOURS PRN
Status: DISCONTINUED | OUTPATIENT
Start: 2021-09-03 | End: 2021-09-04 | Stop reason: HOSPADM

## 2021-09-03 RX ORDER — 0.9 % SODIUM CHLORIDE 0.9 %
1000 INTRAVENOUS SOLUTION INTRAVENOUS ONCE
Status: COMPLETED | OUTPATIENT
Start: 2021-09-03 | End: 2021-09-03

## 2021-09-03 RX ORDER — PAROXETINE 10 MG/1
10 TABLET, FILM COATED ORAL DAILY
Status: DISCONTINUED | OUTPATIENT
Start: 2021-09-03 | End: 2021-09-04 | Stop reason: HOSPADM

## 2021-09-03 RX ORDER — ASPIRIN 81 MG/1
81 TABLET, CHEWABLE ORAL DAILY
Status: DISCONTINUED | OUTPATIENT
Start: 2021-09-03 | End: 2021-09-04 | Stop reason: HOSPADM

## 2021-09-03 RX ORDER — KETOROLAC TROMETHAMINE 15 MG/ML
15 INJECTION, SOLUTION INTRAMUSCULAR; INTRAVENOUS ONCE
Status: COMPLETED | OUTPATIENT
Start: 2021-09-03 | End: 2021-09-03

## 2021-09-03 RX ORDER — ACETAMINOPHEN 650 MG/1
650 SUPPOSITORY RECTAL EVERY 6 HOURS PRN
Status: DISCONTINUED | OUTPATIENT
Start: 2021-09-03 | End: 2021-09-04 | Stop reason: HOSPADM

## 2021-09-03 RX ORDER — CYCLOBENZAPRINE HCL 10 MG
10 TABLET ORAL 3 TIMES DAILY PRN
Status: DISCONTINUED | OUTPATIENT
Start: 2021-09-03 | End: 2021-09-04 | Stop reason: HOSPADM

## 2021-09-03 RX ORDER — SODIUM CHLORIDE 0.9 % (FLUSH) 0.9 %
5-40 SYRINGE (ML) INJECTION EVERY 12 HOURS SCHEDULED
Status: DISCONTINUED | OUTPATIENT
Start: 2021-09-03 | End: 2021-09-04 | Stop reason: HOSPADM

## 2021-09-03 RX ORDER — ONDANSETRON 4 MG/1
4 TABLET, ORALLY DISINTEGRATING ORAL EVERY 8 HOURS PRN
Status: DISCONTINUED | OUTPATIENT
Start: 2021-09-03 | End: 2021-09-04 | Stop reason: HOSPADM

## 2021-09-03 RX ORDER — LORAZEPAM 2 MG/ML
0.5 INJECTION INTRAMUSCULAR ONCE
Status: COMPLETED | OUTPATIENT
Start: 2021-09-03 | End: 2021-09-03

## 2021-09-03 RX ADMIN — SODIUM CHLORIDE 1000 ML: 9 INJECTION, SOLUTION INTRAVENOUS at 15:45

## 2021-09-03 RX ADMIN — CYCLOBENZAPRINE 10 MG: 10 TABLET, FILM COATED ORAL at 22:15

## 2021-09-03 RX ADMIN — SODIUM CHLORIDE, PRESERVATIVE FREE 10 ML: 5 INJECTION INTRAVENOUS at 22:07

## 2021-09-03 RX ADMIN — IOPAMIDOL 90 ML: 755 INJECTION, SOLUTION INTRAVENOUS at 14:21

## 2021-09-03 RX ADMIN — AMITRIPTYLINE HYDROCHLORIDE 25 MG: 25 TABLET, FILM COATED ORAL at 22:15

## 2021-09-03 RX ADMIN — PROCHLORPERAZINE EDISYLATE 10 MG: 5 INJECTION INTRAMUSCULAR; INTRAVENOUS at 22:08

## 2021-09-03 RX ADMIN — ATORVASTATIN CALCIUM 80 MG: 80 TABLET, FILM COATED ORAL at 22:14

## 2021-09-03 RX ADMIN — MORPHINE SULFATE 4 MG: 4 INJECTION INTRAVENOUS at 13:41

## 2021-09-03 RX ADMIN — PROCHLORPERAZINE EDISYLATE 10 MG: 5 INJECTION INTRAMUSCULAR; INTRAVENOUS at 17:34

## 2021-09-03 RX ADMIN — LORAZEPAM 0.5 MG: 2 INJECTION INTRAMUSCULAR at 15:46

## 2021-09-03 RX ADMIN — ASPIRIN 81 MG: 81 TABLET, CHEWABLE ORAL at 22:14

## 2021-09-03 RX ADMIN — KETOROLAC TROMETHAMINE 15 MG: 15 INJECTION, SOLUTION INTRAMUSCULAR; INTRAVENOUS at 17:34

## 2021-09-03 RX ADMIN — DIPHENHYDRAMINE HYDROCHLORIDE 25 MG: 50 INJECTION INTRAMUSCULAR; INTRAVENOUS at 22:08

## 2021-09-03 RX ADMIN — SODIUM CHLORIDE, PRESERVATIVE FREE 10 ML: 5 INJECTION INTRAVENOUS at 22:08

## 2021-09-03 ASSESSMENT — PAIN SCALES - GENERAL
PAINLEVEL_OUTOF10: 6
PAINLEVEL_OUTOF10: 10
PAINLEVEL_OUTOF10: 8
PAINLEVEL_OUTOF10: 3

## 2021-09-03 ASSESSMENT — ENCOUNTER SYMPTOMS
NAUSEA: 0
ABDOMINAL PAIN: 0
VOMITING: 0
COUGH: 0
SHORTNESS OF BREATH: 0
SORE THROAT: 0

## 2021-09-03 ASSESSMENT — PAIN DESCRIPTION - PAIN TYPE
TYPE: ACUTE PAIN
TYPE: ACUTE PAIN

## 2021-09-03 ASSESSMENT — PAIN DESCRIPTION - DESCRIPTORS: DESCRIPTORS: ACHING

## 2021-09-03 ASSESSMENT — PAIN DESCRIPTION - FREQUENCY: FREQUENCY: CONTINUOUS

## 2021-09-03 ASSESSMENT — PAIN DESCRIPTION - LOCATION
LOCATION: HEAD
LOCATION: HEAD

## 2021-09-03 NOTE — ED NOTES
Pt stated that she stated to feel weird after the morphine administration. Pt stated that she had neck pain and was feeling off. Writer got closest resident available to assess the pt.      Carroll Millard RN  09/03/21 [id number]

## 2021-09-03 NOTE — TELEPHONE ENCOUNTER
Received call from Mauricio  at [facility] with [employer] Complaint. Brief description of triage:  45 y/o went to the ER 2 x this week. Memory    Pt was seen in ER Saturday,   A few days ago. Cant recall  Pt has a pounding headache hard to head up   Headache now x 1 week , pt still has the same head   Left side is weak, onset for days   Pt reports weakness on the left side states  Things are  Dropping   Pt states she has a headache that will not let up  Reports ceiling falling on her  7 days ago, states she has new memory loss  Was unable to remember her moms address , guarding on left side she dropped a  Soda due to weakness and pain on the left side  She feels weak and nausea    Triage indicates for patient to  Call 911    Care advice provided, patient verbalizes understanding; denies any other questions or concerns; instructed to call back for any new or worsening symptoms. Attention Provider: Thank you for allowing me to participate in the care of your patient. The patient was connected to triage in response to information provided to the [employer]. Please do not respond through this encounter as the response is not directed to a shared pool. Reason for Disposition   Weakness (i.e., paralysis, loss of muscle strength) of the face, arm or leg on one side of the body    Answer Assessment - Initial Assessment Questions  1. SYMPTOMS: \"What symptom are you most concerned about? \"      Memory problems     2. OTHER SYMPTOMS: \"Do you have any other symptoms? \" (e.g., nausea, difficulty concentrating)       Difficulty  Remembering where  Her mom lived at  Nausea     3. ONSET / PATTERN:  \"Are you the same, getting better, or getting worse? \"  \"What's changed? \"      Hard to eat and memory problems     4. HEADACHE: \"Do you have any headache pain? \" If so, ask: \"How bad is it? \"  (Scale 1-10; or mild, moderate, severe)    - MILD - Doesn't interfere with normal activities    - MODERATE - Interferes with normal activities (e.g., work or school) or awakens from sleep    - SEVERE - Excruciating pain, unable to do any normal activities     Headache has been at a 10 not getting better     5. mTBI: \"When did your head injury happen? \" \"How did it occur? \"       7 days ago    6. mTBI DIAGNOSIS:  \"Who diagnosed your concussion? \"      ER    7. mTBI TESTING: \"Did you have a CT scan or MRI of your head? \"     CT scan     8. mTBI LAST VISIT:  \"When were you seen for your head injury? \"     5 days ago     9. MEDICATIONS:  \"Did you receive any prescription meds? \"  If so, ask:  \"What are they? \" \" Were any OTC meds recommended? \"      Pt was given  4 prescription,    10. FOLLOW-UP APPT:  \"Do you have a follow-up appointment? \"         Pt needs to make an appointment    Protocols used: CONCUSSION (MTBI) LESS THAN 14 DAYS AGO FOLLOW-UP CALL-ADULT-[place]

## 2021-09-03 NOTE — CARE COORDINATION
Case Management Initial Discharge Plan  Bernice Jaquez,             Met with:patient to discuss discharge plans. Information verified: address, contacts, phone number, , insurance Yes  Insurance Provider: [facility]    Emergency Contact/Next of Kin name & number: Mother Melissa Rush as per face sheet  Who are involved in patient's support system? pts children lives with her    PCP: Audra Peralta MD  Date of last visit: less than one year      Discharge Planning    Living Arrangements:    children live with her    Home has 2 stories  3 stairs to climb to get into front door, flight of stairs to climb to reach second floor  Location of bedroom/bathroom in home main    Patient able to perform ADL's:Independent    Current Services (outpatient & in home) DME  DME equipment: none  DME provider: na    Is patient receiving oral anticoagulation therapy? No    If indicated:   Physician managing anticoagulation treatment: none  Where does patient obtain lab work for ATC treatment? na      Potential Assistance Needed:       Patient agreeable to home care: No  [place] of choice provided:  n/a    Prior SNF/Rehab Placement and Facility: none  Agreeable to SNF/Rehab: No  [place] of choice provided: n/a     Evaluation: n/a    Expected Discharge date:       Patient expects to be discharged to: If home: is the family and/or caregiver wiling & able to provide support at home? yes  Who will be providing this support? Lives with her children    Follow Up Appointment: Best Day/ Time:      Transportation provider: kalina Carlisle  Transportation arrangements needed for discharge: No    Readmission Risk              Risk of Unplanned Readmission:  0             Does patient have a readmission risk score greater than 14?: No  If yes, follow-up appointment must be made within 7 days of discharge.      Goals of Care: self care      Educated pt on transitional options, provided freedom of choice and are agreeable with plan      Discharge Plan: goal is home independent, has transportation, follow pt/ot notes for needs          Electronically signed by Corinne Wheat RN on 9/3/21 at 4:10 PM EDT

## 2021-09-03 NOTE — ED PROVIDER NOTES
[facility] ED  Emergency Department Encounter  EmergencyMedicine Resident     Pt Bryce Nguyen  MRN: [id number]  [place] 1970  Date of evaluation: 9/3/21  PCP:  Cherri Elizabeth MD    [address] [place] [address]       Chief Complaint   Patient presents with    Numbness     left side for days, pt had part of the ceiling fall on her a week ago       HISTORY OF PRESENT ILLNESS  (Location/Symptom, Timing/Onset, Context/Setting, Quality, Duration, Modifying Factors, Severity.)      Christen Medeiros is a 46 y.o. female who presents with left facial droop, left-sided weakness. Approximately 7 days ago patient had a ceiling fall on her with questionable loss of consciousness. She was evaluated at that time CT head and cervical spine unremarkable. Neurological exam normal.  Patient was reevaluated a few days later at a different outlying hospital, a in-depth neurological examination was performed which was normal.  Patient is a little confused since the accident states that she has had these symptoms since accident. She also is complaining of a headache. She is not take any blood thinners. PAST MEDICAL / SURGICAL / SOCIAL / FAMILY HISTORY      has a past medical history of Allergic rhinitis, Asthma, Chronic back pain, Fibromyalgia, Lumbar radiculopathy, and Osteoarthritis. has a past surgical history that includes Dilation and curettage of uterus and  section.       Social History     Socioeconomic History    Marital status: Single     Spouse name: Not on file    Number of children: Not on file    Years of education: Not on file    Highest education level: Not on file   Occupational History    Not on file   Tobacco Use    Smoking status: Current Every Day Smoker     Packs/day: 1.00     Years: 30.00     Pack years: 30.00     Types: Cigarettes    Smokeless tobacco: Never Used   Vaping Use    Vaping Use: Former   Substance and Sexual Activity    Alcohol use: No    Drug use: MD   PARoxetine (PAXIL) 10 MG tablet Take 1 tablet by mouth daily  Patient not taking: Reported on 9/18/2020 6/2/20   Cory Phelps,    acetaminophen (APAP EXTRA STRENGTH) 500 MG tablet Take 2 tablets by mouth every 6 hours as needed for Pain 5/1/20   Jovani Basurto, DO       REVIEW OF SYSTEMS    (2-9 systems for level 4, 10 or more for level 5)      Review of Systems   Constitutional: Negative for chills and fever. HENT: Negative for congestion and sore throat. Respiratory: Negative for cough and shortness of breath. Cardiovascular: Negative for chest pain. Gastrointestinal: Negative for abdominal pain, nausea and vomiting. Genitourinary: Negative for dysuria and frequency. Musculoskeletal: Positive for neck pain and neck stiffness. Skin: Negative for rash. Neurological: Positive for weakness, numbness and headaches. PHYSICAL EXAM   (up to 7 for level 4, 8 or more for level 5)      INITIAL VITALS:   BP (!) 130/95   Pulse 59   Temp 98.6 °F (37 °C) (Oral)   Resp 16   Wt 200 lb (90.7 kg)   LMP 02/03/2020 (Approximate)   SpO2 91%   BMI 34.33 kg/m²      Vitals:    09/03/21 1730 09/03/21 1745 09/03/21 1800 09/03/21 1815   BP:       Pulse: 56 67 (!) 45 59   Resp: 10 9 18 16   Temp:       TempSrc:       SpO2: 98% 96% 95% 91%   Weight:            Physical Exam  Vitals reviewed. Constitutional:       General: She is not in acute distress. Appearance: She is well-developed. She is not ill-appearing. HENT:      Head: Normocephalic and atraumatic. Eyes:      Extraocular Movements: Extraocular movements intact. Pupils: Pupils are equal, round, and reactive to light. Cardiovascular:      Rate and Rhythm: Normal rate and regular rhythm. Pulmonary:      Effort: Pulmonary effort is normal.      Breath sounds: Normal breath sounds. Abdominal:      General: There is no distension. Palpations: Abdomen is soft. Tenderness: There is no abdominal tenderness. Musculoskeletal:         General: Normal range of motion. Cervical back: Normal range of motion and neck supple. Skin:     General: Skin is warm and dry. Neurological:      Mental Status: She is alert and oriented to person, place, and time. Comments: Patient having left upper extremity  strength weakness, no drift of extremities on examination however plus 4 out of 5 of the left upper and lower extremity. Subjective sensory deficit of left side of the body including the face. Pupils equal round reactive light extraocular movements intact, left-sided facial droop sparing the forehead.        DIFFERENTIAL  DIAGNOSIS     PLAN (LABS / IMAGING / EKG):  Orders Placed This Encounter   Procedures    CT Head WO Contrast    CTA HEAD NECK W CONTRAST    CT CERVICAL SPINE WO CONTRAST    XR CHEST (2 VW)    STROKE PANEL    ELECTROLYTES PLUS    Hemoglobin and hematocrit, blood    CALCIUM, IONIC (POC)    Troponin    Inpatient consult to Stroke Team    POC Blood Gas and Chemistry    Venous Blood Gas, POC    Creatinine W/GFR Point of Care    POCT urea (BUN)    Lactic Acid, POC    POCT Glucose    EKG 12 Lead    PATIENT STATUS (FROM ED OR OR/PROCEDURAL) Observation       MEDICATIONS ORDERED:  Orders Placed This Encounter   Medications    morphine injection 4 mg    iopamidol (ISOVUE-370) 76 % injection 90 mL    0.9 % sodium chloride bolus    LORazepam (ATIVAN) injection 0.5 mg    prochlorperazine (COMPAZINE) injection 10 mg    diphenhydrAMINE (BENADRYL) injection 12.5 mg    ketorolac (TORADOL) injection 15 mg       DIAGNOSTIC RESULTS / EMERGENCY DEPARTMENT COURSE / MDM   LAB RESULTS:  Results for orders placed or performed during the hospital encounter of 09/03/21   STROKE PANEL   Result Value Ref Range    Glucose 100 (H) 70 - 99 mg/dL    BUN 16 6 - 20 mg/dL    CREATININE 0.77 0.50 - 0.90 mg/dL    Bun/Cre Ratio NOT REPORTED 9 - 20    Calcium 9.4 8.6 - 10.4 mg/dL    Sodium 144 135 - 144 mmol/L    Potassium 4.2 3.7 - 5.3 mmol/L    Chloride 107 98 - 107 mmol/L    CO2 23 20 - 31 mmol/L    Anion Gap 14 9 - 17 mmol/L    GFR Non-African American >60 >60 mL/min    GFR African American >60 >60 mL/min    GFR Comment          GFR Staging NOT REPORTED     WBC 5.9 3.5 - 11.3 k/uL    RBC 4.94 3.95 - 5.11 m/uL    Hemoglobin 14.7 11.9 - 15.1 g/dL    Hematocrit 45.7 36.3 - 47.1 %    MCV 92.5 82.6 - 102.9 fL    MCH 29.8 25.2 - 33.5 pg    MCHC 32.2 28.4 - 34.8 g/dL    RDW 13.1 11.8 - 14.4 %    Platelets 505 122 - 804 k/uL    MPV 9.2 8.1 - 13.5 fL    NRBC Automated 0.0 0.0 per 100 WBC    Total  26 - 192 U/L    CK-MB 1.6 <5.4 ng/mL    % CKMB 1.2 0.0 - 3.0 %    CKMB Interpretation NORMAL ISOENZYME PATTERN     Differential Type NOT REPORTED     WBC Morphology NOT REPORTED     RBC Morphology NOT REPORTED     Platelet Estimate NOT REPORTED     Seg Neutrophils 50 36 - 65 %    Lymphocytes 39 24 - 43 %    Monocytes 8 3 - 12 %    Eosinophils % 2 1 - 4 %    Basophils 1 0 - 2 %    Immature Granulocytes 0 0 %    Segs Absolute 2.95 1.50 - 8.10 k/uL    Absolute Lymph # 2.30 1.10 - 3.70 k/uL    Absolute Mono # 0.48 0.10 - 1.20 k/uL    Absolute Eos # 0.14 0.00 - 0.44 k/uL    Basophils Absolute 0.05 0.00 - 0.20 k/uL    Absolute Immature Granulocyte <0.03 0.00 - 0.30 k/uL    Myoglobin <21 (L) 25 - 58 ng/mL    Protime 10.1 9.1 - 12.3 sec    INR 0.9     PTT 23.0 20.5 - 30.5 sec    Troponin, High Sensitivity <6 0 - 14 ng/L    Troponin T NOT REPORTED <0.03 ng/mL    Troponin Interp NOT REPORTED    ELECTROLYTES PLUS   Result Value Ref Range    POC Sodium 148 (H) 138 - 146 mmol/L    POC Potassium 4.1 3.5 - 4.5 mmol/L    POC Chloride 111 (H) 98 - 107 mmol/L    POC TCO2 30 22 - 30 mmol/L    Anion Gap 8 7 - 16 mmol/L   Hemoglobin and hematocrit, blood   Result Value Ref Range    POC Hemoglobin 16.1 (H) 12.0 - 16.0 g/dL    POC Hematocrit 47 (H) 36 - 46 %   CALCIUM, IONIC (POC)   Result Value Ref Range    POC Ionized Calcium 1.25 1.15 - 1.33 mmol/L   Venous Blood Gas, POC   Result Value Ref Range    pH, Scott 7.326 7.320 - 7.430    pCO2, [place] 56.9 (H) 41.0 - 51.0 mm Hg    pO2, [place] 25.7 (L) 30 - 50 mm Hg    HCO3, Venous 29.8 (H) 22.0 - 29.0 mmol/L    Total CO2, Venous NOT REPORTED 23.0 - 30.0 mmol/L    Negative Base Excess, [place] NOT REPORTED 0.0 - 2.0    Positive Base Excess, [place] 2 0.0 - 3.0    O2 Sat, [place] 41 (L) 60.0 - 85.0 %    O2 Device/Flow/% NOT REPORTED     Eren Test NOT REPORTED     Sample Site NOT REPORTED     Mode NOT REPORTED     FIO2 NOT REPORTED     Pt Temp NOT REPORTED     POC pH Temp NOT REPORTED     POC pCO2 Temp NOT REPORTED mm Hg    POC pO2 Temp NOT REPORTED mm Hg   Creatinine W/GFR Point of Care   Result Value Ref Range    POC Creatinine 0.63 0.51 - 1.19 mg/dL    GFR Comment >60 >60 mL/min    GFR Non-African American >60 >60 mL/min    GFR Comment         POCT urea (BUN)   Result Value Ref Range    POC BUN Result not available. 8 - 26 mg/dL   Lactic Acid, POC   Result Value Ref Range    POC Lactic Acid 1.16 0.56 - 1.39 mmol/L   POCT Glucose   Result Value Ref Range    POC Glucose 98 74 - 100 mg/dL         RADIOLOGY:  XR CHEST (2 VW)   Final Result   No acute airspace disease identified. CTA HEAD NECK W CONTRAST   Final Result   1. Questionable focal stenosis versus tortuosity of the proximal left   superior M2 branch. 2. Otherwise, no evidence of large vessel occlusion, significant stenosis,   dissection, or aneurysm. CT Head WO Contrast   Final Result   No acute intracranial process identified. The findings were sent to the Radiology Results [address] at 2:22   pm on 9/3/2021 to be communicated to the Stroke Neurology Service. CT CERVICAL SPINE WO CONTRAST   Final Result   1. No acute findings in the cervical spine. 2. Minimal to mild cervical spine degenerative changes. EMERGENCY DEPARTMENT COURSE & MDM:    Patient here with headache and hard neurological signs.   Suspect subdural hematoma versus vessel occlusion. Plan for repeat CT head, CTA head and neck, repeat CT cervical spine. Also perform stroke work-up. Patient is quite anxious and is complaining of a headache. Treat with morphine and Ativan. Probable admit. ED Course as of Sep 03 1842   Fri Sep 03, 2021   1458 Patient now have some chest pain suspect this is anxiety related she is quite worked up, EKG showing sinus bradycardia otherwise unremarkable. [CS]   9533 IMPRESSION:  1. Questionable focal stenosis versus tortuosity of the proximal left  superior M2 branch. 2. Otherwise, no evidence of large vessel occlusion, significant stenosis,  dissection, or aneurysm. CTA HEAD NECK W CONTRAST [CS]   3927 CTA showing questionable focal stenosis of left M2 branch. Will consult stroke team.    [CS]      ED Course User Index  [CS] Bob Corral DO         PROCEDURES:      CONSULTS:  IP CONSULT TO STROKE TEAM    CRITICAL CARE:  Please see attending note    FINAL IMPRESSION      1. Stroke-like symptoms          DISPOSITION / PLAN     DISPOSITION Admitted 09/03/2021 06:13:28 PM      PATIENT REFERRED TO:  No follow-up provider specified.     DISCHARGE MEDICATIONS:  New Prescriptions    No medications on file       Bob Corral DO  Emergency Medicine Resident    (Please note that portions of thisnote were completed with a voice recognition program.  Efforts were made to edit the dictations but occasionally words are mis-transcribed.)        Bob Corral DO  Resident  09/03/21 5323

## 2021-09-03 NOTE — ED NOTES
Pt arrived with complaints of left sided weakness, tingling and headache. Pt stated that she was seen a week ago when a piece of her ceiling fell on her and hit her on the head. Ems stated that she did have a CT and that everything came back fine. Pt stated that the weakness and tingling started then . Pt presents with left sided facial droop, decreased sensation in the left arm and leg. Pt is also weak on the left side. Pt stated that she has blurry vision and that she has a headache. Ekg performed. Epoc performed. Resident at the bedside. Pt placed on full cardiac monitor.  Will continue plan of care      Sanchez Wright RN  09/03/21 7036

## 2021-09-03 NOTE — ED PROVIDER NOTES
[facility] ED     Emergency Department     Faculty Attestation        I performed a history and physical examination of the patient and discussed management with the resident. I reviewed the residents note and agree with the documented findings and plan of care. Any areas of disagreement are noted on the chart. I was personally present for the key portions of any procedures. I have documented in the chart those procedures where I was not present during the key portions. I have reviewed the emergency nurses triage note. I agree with the chief complaint, past medical history, past surgical history, allergies, medications, social and family history as documented unless otherwise noted below. For Physician Assistant/ Nurse Practitioner cases/documentation I have personally evaluated this patient and have completed at least one if not all key elements of the E/M (history, physical exam, and MDM). Additional findings are as noted. Vital Signs: BP: (!) 171/83  Pulse: 64  Resp: 14  Temp: 98.1 °F (36.7 °C) SpO2: 99 %  PCP:  Ari Corea MD    Pertinent Comments:     Patient is a 78-year-old female who states 1 week ago had part of a ceiling collapse on her head with positive LOC. A day or 2 after that was seen at [facility] and had CT head as well as CT C-spine read as negative by radiology and was neurologically intact and discharged. 3 days ago was seen at [facility] with continued headache and neck pain and was given symptomatic treatment and discharged. At that time had normal neurological examination documented as well as on the first time. Patient states that she has felt weak subjectively on the left since the episode and is unsure of when symptoms began but clearly now has left-sided facial droop as well as left side 4/5 weakness of the arm and leg. There is sparing of the forehead as well.    Denies chest pain or abdominal pain or shortness of breath. Patient complains of midline neck pain however lower back pain is absent per the patient. Assessment/plan: Patient with obvious neuro deficit concerning for CVA versus delayed traumatic injury with slow evolving. Will obtain CT head as well as repeat CT C-spine and CT of the head and neck. Stroke laboratories as well. Admission after        CRITICAL CARE: There was a high probability of clinically significant/life threatening deterioration in this patient's condition which required my urgent intervention. Total critical care time was 30 minutes. This excludes any time for separately reportable procedures. This patient was evaluated in the Emergency Department for symptoms described in the history of present illness. He/she was evaluated in the context of the global COVID-19 pandemic, which necessitated consideration that the patient might be at risk for infection with the SARS-CoV-2 virus that causes COVID-19. Institutional protocols and algorithms that pertain to the evaluation of patients at risk for COVID-19 are in a state of rapid change based on information released by regulatory bodies including the CDC and federal and state organizations. These policies and algorithms were followed during the patient's care in the ED. (Please note that portions of this note were completed with a voice recognition program. Efforts were made to edit the dictations but occasionally words are mis-transcribed.  Whenever words are used in this note in any gender, they shall be construed as though they were used in the gender appropriate to the circumstances; and whenever words are used in this note in the singular or plural form, they shall be construed as though they were used in the form appropriate to the circumstances.)    MD Jeb Peres  Attending Emergency Medicine Physician           Tay Lorenzo MD  09/03/21 2297

## 2021-09-03 NOTE — CONSULTS
[facility], [address], [address] [place] [address]  Ph: [phone] or [phone]  FAX: [phone]        Reason for consult: Headache    I had the pleasure of seeing your patient in neurology consultation for her symptoms. As you would recall Gurjit Jimenez is a 46 y.o. yo female admitted on 9/3/2021. History is obtained from the patient and from medical records. The patient had an episode a week ago when apparently was sitting fell on her head. It is unclear if she lost consciousness. The patient states that since then, she has been having diffuse headache which is bitemporal and occipital in location, moderate to severe intensity and has been having difficulty concentration. She went to [facility] where she underwent CT head, CT cervical spine which was remarkable. The patient then went to [facility] afterwards for similar symptoms, CT angiogram of the neck was negative. The patient has now presented to [address] [place] [address] with left-sided weakness, tingling, headache and mild headache. The patient was symptomatically given morphine for which she reports some improvement of the symptoms. No previous similar symptoms.   Past Medical History:   Diagnosis Date    Allergic rhinitis     Asthma     Chronic back pain     on 8/10/18 pt denies pain mgmnt    Fibromyalgia     Lumbar radiculopathy     Osteoarthritis      Past Surgical History:   Procedure Laterality Date     SECTION      DILATION AND CURETTAGE OF UTERUS      SAB     Social History     Socioeconomic History    Marital status: Single     Spouse name: Not on file    Number of children: Not on file    Years of education: Not on file    Highest education level: Not on file   Occupational History    Not on file   Tobacco Use    Smoking status: Current Every Day Smoker     Packs/day: 1.00     Years: 30.00     Pack years: 30.00     Types: Cigarettes    Smokeless tobacco: Never Used   Vaping Use    Vaping Use: Former   Substance and Sexual Activity    Alcohol use: No    Drug use: Not Currently     Comment: Edgar  2 x week    Sexual activity: Yes     Partners: Male   Other Topics Concern    Not on file   Social History Narrative    Not on file     Social Determinants of Health     Financial Resource Strain:     Difficulty of Paying Living Expenses:    Food Insecurity:     Worried About Running Out of Food in the Last Year:     [address] in the Last Year:    Transportation Needs:     Lack of Transportation (Medical):      Lack of Transportation (Non-Medical):    Physical Activity:     Days of Exercise per Week:     Minutes of Exercise per Session:    Stress:     Feeling of Stress :    Social Connections:     Frequency of Communication with Friends and Family:     Frequency of Social Gatherings with Friends and Family:     Attends Hindu Services:     Active Member of Clubs or Organizations:     Attends Club or Organization Meetings:     Marital Status:    Intimate Partner Violence:     Fear of Current or Ex-Partner:     Emotionally Abused:     Physically Abused:     Sexually Abused:      Family History   Problem Relation Age of Onset    Breast Cancer Paternal Grandmother         unknown age dx    Breast Cancer Paternal Aunt         unknown age dx   Aide Curl Uterine Cancer Neg Hx     Colon Cancer Neg Hx     Ovarian Cancer Neg Hx       Allergies   Allergen Reactions    Morphine Anxiety and Other (See Comments)     Pt stated that the morphine gave her next pain and made her feel unwell       BP (!) 148/85   Pulse 61   Temp 98.6 °F (37 °C) (Oral)   Resp 15   Wt 200 lb (90.7 kg)   LMP 02/03/2020 (Approximate)   SpO2 96%   BMI 34.33 kg/m²      ROS:  Constitutional Negative for fever and chills   HEENT Negative for ear discharge, ear pain, nosebleed   Eyes Negative for photophobia, pain and discharge   Respiratory Negative for hemoptysis and sputum   Cardiovascular Negative for orthopnea, claudication and PND   Gastrointestinal Negative for abdominal pain, diarrhea, blood in stool   Musculoskeletal Negative for joint pain, negative for myalgia   Skin Negative for rash or itching   Endo/heme/allergies Negative for polydipsia, environmental allergy   Psychiatric/behavioral Negative for suicidal ideation. Patient is not anxious   General examination:    Head: Normocephalic, atraumatic  Eyes: Extraocular movements intact  Lungs: Respirations unlabored, chest wall no deformity  ENT: Normal external ear canals, no sinus tenderness  Heart: Regular rate rhythm  Abdomen: No masses, tenderness  Extremities: No cyanosis or edema, 2+ pulses  Skin: Intact, normal skin color    Neurological examination:    Mental status   Alert and oriented; intact memory with no confusion, speech or language problems; no hallucinations or delusions     Cranial nerves   II - visual fields intact to confrontation                                                III, IV, VI - extra-ocular muscles full: no pupillary defect; no AUGUSTINE, no nystagmus, no ptosis                                                                      V - normal facial sensation                                                               VII - normal facial symmetry                                                             VIII - intact hearing                                                                             IX, X - symmetrical palate                                                                  XI - symmetrical shoulder shrug                                                       XII - midline tongue without atrophy or fasciculation     Motor function  Normal muscle bulk and tone; normal power 5/5, including fine motor movements     Sensory function Intact to touch, pin, vibration, proprioception     Cerebellar Intact fine motor movement.  No involuntary movements or tremors Reflex function Intact 2+ DTR and symmetric. Negative Babinski     Gait                  Not tested         Lab Results   Component Value Date    LDLCHOLESTEROL 112 01/16/2019     No components found for: CHLPL  No results found for: TRIG  Lab Results   Component Value Date    HDL 61 01/16/2019     No results found for: LDLCALC  No results found for: LABVLDL  No results found for: LABA1C  No results found for: EAG  No results found for: [id number]   Neurological work up:  CT head  CTA head and neck  MRI brain   2 D echo     Assessment and Recommendations:   Symptoms suggestive of postconcussion syndrome including headache, dizziness, decreased concentration    I will obtain MRI scan of the brain without contrast  Symptomatic treatment with IV Toradol, Reglan, magnesium and Compazine  Anticipate discharge tomorrow if symptoms improve  We will follow while she is here. Aydee Posadas MD  Neurology    This note is created with the assistance of a speech-recognition program. While intending to generate a document that actually reflects the content of the visit, the document can still have some errors including those of syntax and sound a- like substitutions which may escape proofreading. In such instances, actual meaning can be extrapolated by contextual derivation.

## 2021-09-03 NOTE — ED PROVIDER NOTES
[facility]  Emergency Department  Faculty Sign-Out Addendum     Care of Ming Guardado was assumed from previous attending and is being seen for Numbness (left side for days, pt had part of the ceiling fall on her a week ago)  . The patient's initial evaluation and plan have been discussed with the prior provider who initially evaluated the patient. Handoff taken on the following patient from prior Attending Physician:    Satish Carmen    I was available and discussed any additional care issues that arose and coordinated the management plans with the resident(s) caring for the patient during my duty period. Any areas of disagreement with residents documentation of care or procedures are noted on the chart. I was personally present for the key portions of any/all procedures during my duty period. I have documented in the chart those procedures where I was not present during the key portions.       EMERGENCY DEPARTMENT COURSE / MEDICAL DECISION MAKING:       MEDICATIONS GIVEN:  Orders Placed This Encounter   Medications    morphine injection 4 mg    iopamidol (ISOVUE-370) 76 % injection 90 mL    0.9 % sodium chloride bolus    LORazepam (ATIVAN) injection 0.5 mg       LABS / RADIOLOGY:     Labs Reviewed   STROKE PANEL - Abnormal; Notable for the following components:       Result Value    Glucose 100 (*)     Myoglobin <21 (*)     All other components within normal limits   ELECTROLYTES PLUS - Abnormal; Notable for the following components:    POC Sodium 148 (*)     POC Chloride 111 (*)     All other components within normal limits   HGB/HCT - Abnormal; Notable for the following components:    POC Hemoglobin 16.1 (*)     POC Hematocrit 47 (*)     All other components within normal limits   VENOUS BLOOD GAS, POINT OF CARE - Abnormal; Notable for the following components:    pCO2, Scott 56.9 (*)     pO2, Scott 25.7 (*)     HCO3, Venous 29.8 (*)     O2 Sat, Scott 41 (*)     All other components within normal limits   CALCIUM, IONIC (POC)   TROPONIN   CREATININE W/GFR POINT OF CARE   UREA N (POC)   LACTIC ACID,POINT OF CARE   POCT GLUCOSE   POC BLOOD GAS AND CHEMISTRY       CT Head WO Contrast    Result Date: 9/3/2021  No acute intracranial process identified. The findings were sent to the Radiology Results [address] at 2:22 pm on 9/3/2021to be communicated to the Stroke Neurology service. CT HEAD WO CONTRAST    Result Date: 8/28/2021  EXAMINATION: CT OF THE HEAD WITHOUT CONTRAST; CT OF THE CERVICAL SPINE WITHOUT CONTRAST 8/28/2021 11:32 am; 8/28/2021 11:31 am TECHNIQUE: CT of the head was performed without the administration of intravenous contrast. Dose modulation, iterative reconstruction, and/or weight based adjustment of the mA/kV was utilized to reduce the radiation dose to as low as reasonably achievable.; CT of the cervical spine was performed without the administration of intravenous contrast. Multiplanar reformatted images are provided for review. Dose modulation, iterative reconstruction, and/or weight based adjustment of the mA/kV was utilized to reduce the radiation dose to as low as reasonably achievable. COMPARISON: None.  HISTORY: ORDERING SYSTEM PROVIDED HISTORY: Head injury with headache TECHNOLOGIST PROVIDED HISTORY: Head injury with headache Decision Support Exception - unselect if not a suspected or confirmed emergency medical condition->Emergency Medical Condition (MA) Is the patient pregnant?->No Reason for Exam: Head injury with headache Acuity: Acute Type of Exam: Initial Mechanism of Injury: PT STATES A SECTION OF CEILING FELL, HITTING HER ON THE HEAD, C/O HEAD AND NECK PAIN; ORDERING SYSTEM PROVIDED HISTORY: Head injury with pain to the neck and numbness in the arm TECHNOLOGIST PROVIDED HISTORY: Head injury with pain to the neck and numbness in the arm Decision Support Exception - unselect if not a suspected or confirmed emergency medical condition->Emergency Medical Condition (MA) Is the patient pregnant?->No Reason for Exam: Head injury with pain to the neck and numbness in the arm Acuity: Acute Type of Exam: Initial Mechanism of Injury: PT STATES A SECTION OF CEILING FELL, HITTING HER ON THE HEAD, C/O HEAD AND NECK PAIN, C/O LEFT HAND NUMBNESS 75-year-old female states a section of ceiling fell hitting on the head; head and neck pain as well as left hand numbness. FINDINGS: CT head: BRAIN/VENTRICLES: The foramen magnum and 4th ventricles appear patent. The ventricles are normal in size and midline in position. The sulci, cisterns, and extra-axial spaces are grossly unremarkable in appearance. No abnormal extra-axial fluid collections are identified. There is no clear evidence for acute intracranial hemorrhage, mass, mass effect, or midline shift. There is gross preservation of the gray-white matter differentiation. The bilateral basal ganglia, thalami, and insular ribbons are relatively well-defined. ORBITS: The visualized portion of the orbits demonstrate no acute abnormality. SINUSES: The visualized paranasal sinuses and mastoid air cells demonstrate no acute abnormality. SOFT TISSUES/SKULL: No acute abnormality of the visualized skull or soft tissues. CT cervical spine: BONES/ALIGNMENT: Cervical spine is imaged from the skull base to the superior endplate of T3 on the sagittal reconstructions. Gross preservation of the vertebral body heights. Mild disc space narrowing and hypertrophic osteophyte spur formation at C4-C5 and C5-C6. Mild degenerative changes of the atlantodental and craniocervical junction. Mild reversal of the cervical lordosis. Axial images demonstrate no clear evidence for acute fracture within the cervical spine. Odontoid appears intact. Lateral masses symmetric in appearance. Occipital condyles articulate properly with the lateral masses. DEGENERATIVE CHANGES: No significant degenerative changes.  SOFT TISSUES: There is no prevertebral soft tissue swelling. Mild emphysema. CT head: No acute intracranial abnormality evident by CT. CT cervical spine: 1. Mild degenerative changes in the mid cervical spine. Mild reversal of the cervical lordosis. 2. No clear evidence for acute vertebral body height loss or fracture within the cervical spine. CT CERVICAL SPINE WO CONTRAST    Result Date: 9/3/2021  EXAMINATION: CT OF THE CERVICAL SPINE WITHOUT CONTRAST 9/3/2021 2:16 pm TECHNIQUE: CT of the cervical spine was performed without the administration of intravenous contrast. Multiplanar reformatted images are provided for review. Dose modulation, iterative reconstruction, and/or weight based adjustment of the mA/kV was utilized to reduce the radiation dose to as low as reasonably achievable. COMPARISON: Cervical spine CT 08/28/2021 HISTORY: ORDERING SYSTEM PROVIDED HISTORY: midline tenderness. Pain fell on head TECHNOLOGIST PROVIDED HISTORY: midline tenderness. Pain fell on head Decision Support Exception - unselect if not a suspected or confirmed emergency medical condition->Emergency Medical Condition (MA) Is the patient pregnant?->No Reason for Exam: l sided pain and weakness, post injury Acuity: Acute Type of Exam: Initial FINDINGS: BONES/ALIGNMENT:  No acute fracture. Straightening of cervical lordosis. Unchanged grade 1 retrolisthesis of C5 on C6 related to underlying degenerative changes. DEGENERATIVE CHANGES:  Mild to moderate degenerative changes of the anterior atlantoaxial joint. Minimal to mild degenerative disc disease and facet arthropathy. SOFT TISSUES:  Normal appearance of the prevertebral soft tissues. Atrophic thyroid. At least minimal centrilobular emphysema. 1. No acute findings in the cervical spine. 2. Minimal to mild cervical spine degenerative changes.      CT CERVICAL SPINE WO CONTRAST    Result Date: 8/28/2021  EXAMINATION: CT OF THE HEAD WITHOUT CONTRAST; CT OF THE CERVICAL SPINE WITHOUT CONTRAST 8/28/2021 11:32 am; 8/28/2021 11:31 am TECHNIQUE: CT of the head was performed without the administration of intravenous contrast. Dose modulation, iterative reconstruction, and/or weight based adjustment of the mA/kV was utilized to reduce the radiation dose to as low as reasonably achievable.; CT of the cervical spine was performed without the administration of intravenous contrast. Multiplanar reformatted images are provided for review. Dose modulation, iterative reconstruction, and/or weight based adjustment of the mA/kV was utilized to reduce the radiation dose to as low as reasonably achievable. COMPARISON: None. HISTORY: ORDERING SYSTEM PROVIDED HISTORY: Head injury with headache TECHNOLOGIST PROVIDED HISTORY: Head injury with headache Decision Support Exception - unselect if not a suspected or confirmed emergency medical condition->Emergency Medical Condition (MA) Is the patient pregnant?->No Reason for Exam: Head injury with headache Acuity: Acute Type of Exam: Initial Mechanism of Injury: PT STATES A SECTION OF CEILING FELL, HITTING HER ON THE HEAD, C/O HEAD AND NECK PAIN; ORDERING SYSTEM PROVIDED HISTORY: Head injury with pain to the neck and numbness in the arm TECHNOLOGIST PROVIDED HISTORY: Head injury with pain to the neck and numbness in the arm Decision Support Exception - unselect if not a suspected or confirmed emergency medical condition->Emergency Medical Condition (MA) Is the patient pregnant?->No Reason for Exam: Head injury with pain to the neck and numbness in the arm Acuity: Acute Type of Exam: Initial Mechanism of Injury: PT STATES A SECTION OF CEILING FELL, HITTING HER ON THE HEAD, C/O HEAD AND NECK PAIN, C/O LEFT HAND NUMBNESS 29-year-old female states a section of ceiling fell hitting on the head; head and neck pain as well as left hand numbness. FINDINGS: CT head: BRAIN/VENTRICLES: The foramen magnum and 4th ventricles appear patent. The ventricles are normal in size and midline in position.   The sulci, cisterns, and extra-axial spaces are grossly unremarkable in appearance. No abnormal extra-axial fluid collections are identified. There is no clear evidence for acute intracranial hemorrhage, mass, mass effect, or midline shift. There is gross preservation of the gray-white matter differentiation. The bilateral basal ganglia, thalami, and insular ribbons are relatively well-defined. ORBITS: The visualized portion of the orbits demonstrate no acute abnormality. SINUSES: The visualized paranasal sinuses and mastoid air cells demonstrate no acute abnormality. SOFT TISSUES/SKULL: No acute abnormality of the visualized skull or soft tissues. CT cervical spine: BONES/ALIGNMENT: Cervical spine is imaged from the skull base to the superior endplate of T3 on the sagittal reconstructions. Gross preservation of the vertebral body heights. Mild disc space narrowing and hypertrophic osteophyte spur formation at C4-C5 and C5-C6. Mild degenerative changes of the atlantodental and craniocervical junction. Mild reversal of the cervical lordosis. Axial images demonstrate no clear evidence for acute fracture within the cervical spine. Odontoid appears intact. Lateral masses symmetric in appearance. Occipital condyles articulate properly with the lateral masses. DEGENERATIVE CHANGES: No significant degenerative changes. SOFT TISSUES: There is no prevertebral soft tissue swelling. Mild emphysema. CT head: No acute intracranial abnormality evident by CT. CT cervical spine: 1. Mild degenerative changes in the mid cervical spine. Mild reversal of the cervical lordosis. 2. No clear evidence for acute vertebral body height loss or fracture within the cervical spine.      CTA HEAD NECK W CONTRAST    Result Date: 9/3/2021  EXAMINATION: CTA OF THE HEAD AND NECK WITH CONTRAST 9/3/2021 2:18 pm: TECHNIQUE: CTA of the head and neck was performed with the administration of intravenous contrast. Multiplanar reformatted images are provided for review. MIP images are provided for review. Stenosis of the internal carotid arteries measured using NASCET criteria. Dose modulation, iterative reconstruction, and/or weight based adjustment of the mA/kV was utilized to reduce the radiation dose to as low as reasonably achievable. COMPARISON: None. HISTORY: ORDERING SYSTEM PROVIDED HISTORY: L facial droop and weakness.  had ceiling fall on her ~5 days ago TECHNOLOGIST PROVIDED HISTORY: L facial droop and weakness.  had ceiling fall on her ~5 days ago Decision Support Exception - unselect if not a suspected or confirmed emergency medical condition->Emergency Medical Condition (MA) Reason for Exam: L facial droop and weakness Acuity: Acute Type of Exam: Initial FINDINGS: CTA NECK: AORTIC ARCH/ARCH VESSELS: No dissection or arterial injury. No significant stenosis of the brachiocephalic or subclavian arteries. CAROTID ARTERIES: No dissection, arterial injury, or hemodynamically significant stenosis by NASCET criteria. VERTEBRAL ARTERIES: No dissection, arterial injury, or significant stenosis. SOFT TISSUES: The lung apices are clear. No cervical or superior mediastinal lymphadenopathy. The larynx and pharynx are unremarkable. No acute abnormality of the salivary and thyroid glands. BONES: No acute osseous abnormality. CTA HEAD: ANTERIOR CIRCULATION: Questionable focal stenosis versus tortuosity of the proximal left superior M2 branch. Otherwise, no significant stenosis of the intracranial internal carotid, anterior cerebral, or middle cerebral arteries. No aneurysm. POSTERIOR CIRCULATION: No significant stenosis of the vertebral, basilar, or posterior cerebral arteries. No aneurysm. OTHER: No dural venous sinus thrombosis on this non-dedicated study. BRAIN: No mass effect or midline shift. No extra-axial fluid collection. 1. Questionable focal stenosis versus tortuosity of the proximal left superior M2 branch.  2. Otherwise, no evidence of large vessel occlusion, significant stenosis, dissection, or aneurysm. RECENT VITALS:     Temp: 98.1 °F (36.7 °C),  Pulse: 70, Resp: 17, BP: (!) 154/83, SpO2: 98 %    This patient is a 46 y.o. Female with numbness. Recent head injury. CTs were negative on initial evaluation. Now presents with left-sided numbness and facial droop with forehead sparing. Repeat imaging does not show large vessel occlusion in the head or neck. Neurology consultation.   Likely admission    OUTSTANDING TASKS / RECOMMENDATIONS:    1. Neurology recommendations      Cierra Murray MD, Maria De Jesus Navas  Attending Emergency Physician  [facility]       Kevin Wooten MD  09/03/21 [id number]

## 2021-09-03 NOTE — ED NOTES
Dr. Ellis Brooms at the bedside to assess the pt for morphine reaction. Pt stated that she is feeling better.  Pharmacist consulted for assistance      Jaime Birmingham RN  09/03/21 [id number]

## 2021-09-03 NOTE — ED NOTES
Pt stated that she feels a lot better and that the weird feeling is starting to disappear      Panfilo Ray RN  09/03/21 0750

## 2021-09-03 NOTE — FLOWSHEET NOTE
[address] [facility] [address]     Emergency/Trauma Note    PATIENT NAME: Kamari Avelar    Shift date: 9/3/21  Shift day: Friday   Shift # 1    Room # 16/16   Name: Kamari Avelar            Age: 46 y.o. Gender: female          Adventism: Religion  Place of Restorationism: [address]  Trauma/Incident type: Stroke Alert  Admit Date & Time: 9/3/2021  1:08 PM    ADVANCE DIRECTIVES IN CHART? No    NAME OF DECISION MAKER: Unknown    RELATIONSHIP OF DECISION MAKER TO PATIENT: Unknown    PATIENT/EVENT DESCRIPTION:  Kamari Avelar is a 46 y.o. female who arrived via ambulance complaining of stroke symptoms including left sided weakness and severe headache. . Pt to be admitted to 16/16. SPIRITUAL ASSESSMENT/INTERVENTION:     09/03/21 1510   Encounter Summary   Services provided to: Patient   Referral/Consult From: Multi-disciplinary team   Support System Parent; Children   Place of [employer] No   Continue Visiting   (9/3/21)   Complexity of Encounter Moderate   Length of Encounter 30 minutes   Spiritual Assessment Completed Yes   Crisis   Type Stroke Alert   Assessment Approachable;Tearful;Coping;Fearful   Intervention Active listening;Explored feelings, thoughts, concerns;Nurtured hope;Prayer;Sustaining presence/ Ministry of presence; Discussed belief system/Bahai practices/gardenia   Outcome Acceptance;Comfort;Expressed gratitude     I spoke with the patient who was tearful. I provided emotional and spiritual support. She was glad to see the  and was receptive to my visit. I assisted her to call her mother on the phone in the room. I asked if a prayer would be helpful and she agreed. PATIENT BELONGINGS:  No belongings noted    ANY BELONGINGS OF SIGNIFICANT VALUE NOTED:  Cell Phone. REGISTRATION STAFF NOTIFIED?   Yes    WHAT IS YOUR SPIRITUAL CARE PLAN FOR THIS PATIENT?:  Chaplains are available for further spiritual and emotional support as

## 2021-09-03 NOTE — CONSULTS
is functional and able to perform her daily activities without any impairment. LKW: 5 days ago  TPA: Not indicated  Endovascular:   Not indicated  CT WO contrast: No intracranial hemorrhage  NIHSS: 1  ABCD2: 1  Anticoagulation: None  LDL: Pending  A1C: Pending  At home, patient was on: No AC/AP         PAST MEDICAL HISTORY :       Past Medical History:        Diagnosis Date    Allergic rhinitis     Asthma     Chronic back pain     on 8/10/18 pt denies pain mgmnt    Fibromyalgia     Lumbar radiculopathy     Osteoarthritis        Past Surgical History:        Procedure Laterality Date     SECTION      DILATION AND CURETTAGE OF UTERUS      SAB       Social History:   Social History     Socioeconomic History    Marital status: Single     Spouse name: Not on file    Number of children: Not on file    Years of education: Not on file    Highest education level: Not on file   Occupational History    Not on file   Tobacco Use    Smoking status: Current Every Day Smoker     Packs/day: 1.00     Years: 30.00     Pack years: 30.00     Types: Cigarettes    Smokeless tobacco: Never Used   Vaping Use    Vaping Use: Former   Substance and Sexual Activity    Alcohol use: No    Drug use: Not Currently     Comment: Edgar  2 x week    Sexual activity: Yes     Partners: Male   Other Topics Concern    Not on file   Social History Narrative    Not on file     Social Determinants of Health     Financial Resource Strain:     Difficulty of Paying Living Expenses:    Food Insecurity:     Worried About Running Out of Food in the Last Year:     Ran Out of Food in the Last Year:    Transportation Needs:     Lack of Transportation (Medical):      Lack of Transportation (Non-Medical):    Physical Activity:     Days of Exercise per Week:     Minutes of Exercise per Session:    Stress:     Feeling of Stress :    Social Connections:     Frequency of Communication with Friends and Family:     Frequency of Social Gatherings with Friends and Family:     Attends Mosque Services:     Active Member of Clubs or Organizations:     Attends Club or Organization Meetings:     Marital Status:    Intimate Partner Violence:     Fear of Current or Ex-Partner:     Emotionally Abused:     Physically Abused:     Sexually Abused:        Family History:       Problem Relation Age of Onset    Breast Cancer Paternal Grandmother         unknown age dx    Breast Cancer Paternal Aunt         unknown age dx   Blase Liming Uterine Cancer Neg Hx     Colon Cancer Neg Hx     Ovarian Cancer Neg Hx        Allergies:  Morphine    Home Medications:  Prior to Admission medications    Medication Sig Start Date End Date Taking? Authorizing Provider   Lidocaine HCl (ASPERCREME W/LIDOCAINE) 4 % CREA Apply topically to affected areas 3 times daily as needed.  8/31/21   Gio Mckeon DO   ibuprofen (ADVIL;MOTRIN) 800 MG tablet Take 1 tablet by mouth every 8 hours as needed for Pain 8/28/21   Deshaun Yeager MD   cyclobenzaprine (FLEXERIL) 10 MG tablet Take 1 tablet by mouth 3 times daily as needed for Muscle spasms 8/28/21 9/7/21  Deshaun Yegaer MD   permethrin (ELIMITE) 5 % cream Apply topically as directed 1/12/21   Jeffery Barron MD   PARoxetine (PAXIL) 10 MG tablet Take 1 tablet by mouth daily  Patient not taking: Reported on 9/18/2020 6/2/20   Natali Ragsdale DO   acetaminophen (APAP EXTRA STRENGTH) 500 MG tablet Take 2 tablets by mouth every 6 hours as needed for Pain 5/1/20   Gio Mckeon DO       Current Medications:   Current Facility-Administered Medications: diphenhydrAMINE (BENADRYL) injection 12.5 mg, 12.5 mg, IntraVENous, Q6H PRN    REVIEW OF SYSTEMS:       CONSTITUTIONAL: negative for fatigue and malaise   EYES:  + For blurry vision and photo and phonophobia   HEENT: negative for tinnitus and sore throat   RESPIRATORY: negative for cough, shortness of breath   CARDIOVASCULAR: negative for chest pain, palpitations GASTROINTESTINAL:  + For nausea/vomiting   GENITOURINARY: negative for incontinence   MUSCULOSKELETAL:  + For neck and back pain   NEUROLOGICAL: negative for seizures, + for recurrent headaches   PSYCHIATRIC: negative for fatigue     Review of systems otherwise negative. PHYSICAL EXAM:       BP (!) 148/85   Pulse 61   Temp 98.6 °F (37 °C) (Oral)   Resp 15   Wt 200 lb (90.7 kg)   LMP 02/03/2020 (Approximate)   SpO2 96%   BMI 34.33 kg/m²     CONSTITUTIONAL:  Well developed, well nourished, alert and oriented x 3, in no acute distress. GCS 15, nontoxic. No dysarthria or aphasia   HEAD:  normocephalic, atraumatic    EYES:  PERRLA, EOMI. no deviation   ENT:  moist mucous membranes   NECK:  supple, symmetric, no midline tenderness to palpation    BACK:  without midline tenderness, step-offs or deformities    LUNGS:  Equal air entry bilaterally   CARDIOVASCULAR:  normal s1 / s2   ABDOMEN:  Soft, no rigidity   NEUROLOGIC:  Mental Status:  A & O x3,awake             Cranial Nerves:    cranial nerves II-XII are grossly intact    Motor Exam:    Drift:  absent  Tone:  normal    Motor exam is 5 out of 5 all extremities with the exception of left upper extremity weakness 4/5 with extension flexion at the elbow and wrist.    Sensory:    Touch:    Right Upper Extremity:  normal  Left Upper Extremity:  normal  Right Lower Extremity:  normal  Left Lower Extremity:  normal    Deep Tendon Reflexes:    Right Bicep:  2+  Left Bicep:  2+  Right Knee:  2+  Left Knee:  2+    Plantar Response:  Right:  downgoing  Left:  downgoing    Clonus:  absent  Girard's:  absent    Coordination/Dysmetria:  Heel to Shin:  Right:  normal  Left:  normal  Finger to Nose:   Right:  normal  Left:  normal   Dysdiadochokinesia:  absent    Gait:  deferred    INITIAL NIH STROKE SCALE:    Time Performed:  9190 PM     1a. Level of consciousness:  0 - alert; keenly responsive  1b.   Level of consciousness questions:  0 - answers both questions correctly  1c. Level of consciousness questions:  0 - performs both tasks correctly  2. Best Gaze:  0 - normal  3. Visual:  0 - no visual loss  4. Facial Palsy:  0 - normal symmetric movement  5a. Motor left arm:  0 - no drift, limb holds 90 (or 45) degrees for full 10 seconds  5b. Motor right arm:  0 - no drift, limb holds 90 (or 45) degrees for full 10 seconds  6a. Motor left le - no drift; leg holds 30 degree position for full 5 seconds  6b. Motor right le - no drift; leg holds 30 degree position for full 5 seconds  7. Limb Ataxia:  0 - absent  8. Sensory:  0 - normal; no sensory loss  9. Best Language:  0 - no aphasia, normal  10. Dysarthria:  0 - normal  11.   Extinction and Inattention:  0 - no abnormality    TOTAL:  1     SKIN:  no rash      LABS AND IMAGING:     CBC with Differential:    Lab Results   Component Value Date    WBC 5.9 2021    RBC 4.94 2021    HGB 14.7 2021    HCT 45.7 2021     2021    MCV 92.5 2021    MCH 29.8 2021    MCHC 32.2 2021    RDW 13.1 2021    LYMPHOPCT 39 2021    MONOPCT 8 2021    BASOPCT 1 2021    MONOSABS 0.48 2021    LYMPHSABS 2.30 2021    EOSABS 0.14 2021    BASOSABS 0.05 2021    DIFFTYPE NOT REPORTED 2021     BMP:    Lab Results   Component Value Date     2021    K 4.2 2021     2021    CO2 23 2021    BUN 16 2021    LABALBU 4.4 2018    CREATININE 0.77 2021    CREATININE 0.63 2021    CALCIUM 9.4 2021    GFRAA >60 2021    LABGLOM >60 2021    GLUCOSE 100 2021       Radiology Review: CT head negative for any acute process      ASSESSMENT AND PLAN:       Patient Active Problem List   Diagnosis    Chronic back pain    Neck pain    Asthma    Family history of breast cancer    Hypertrophy of lingual tonsil    Abnormal computed tomography scan    Smoker    Lumbar radiculopathy    Post-concussion headache       46 y.o. female who presents with recurrent headaches and neck discomfort after the ceiling on her home and subsequently hit her head. Unclear if there was any LOC and patient has been having constant throbbing pain radiating to her neck. CT head and CT cervical spine along with CTA did not show any LVO or acute process. Neurologically, she is intact and does not display any focal deficit except for subtle upper extremity weakness. She has been on Flexeril which has not been very beneficial.  We will proceed with a migraine cocktail as this did not appear to be an ischemic stroke, we will also proceed with an MRI to rule out other etiologies. Most likely this postconcussive syndrome that occurs with recurrent headaches, forgetfulness and intermittent periods of confusion. Neurological-postconcussive headaches  -Continue to monitor for any neurological change.  -Stroke work-up pending. -MRI stroke protocol pending  -Amitriptyline 25 mg nightly  -We will proceed with a migraine cocktail including Benadryl and Compazine/Toradol  -P.o. diet as tolerating  -DVT prophylaxis  -CTA-head and neck right to n.p.o. stenosis  -We will admit under observation and reevaluate in a.m. after additional imaging studies. Please contact [facility] with any changes in patients neurologic status.            Leah Wheeler MD   9/3/2021  6:14 PM

## 2021-09-04 ENCOUNTER — APPOINTMENT (OUTPATIENT)
Dept: MRI IMAGING | Age: 51
End: 2021-09-04
Payer: COMMERCIAL

## 2021-09-04 VITALS
RESPIRATION RATE: 16 BRPM | WEIGHT: 200 LBS | HEART RATE: 50 BPM | SYSTOLIC BLOOD PRESSURE: 146 MMHG | BODY MASS INDEX: 34.33 KG/M2 | DIASTOLIC BLOOD PRESSURE: 67 MMHG | TEMPERATURE: 97.9 F | OXYGEN SATURATION: 100 %

## 2021-09-04 LAB
CHOLESTEROL/HDL RATIO: 4
CHOLESTEROL: 206 MG/DL
EKG ATRIAL RATE: 52 BPM
EKG P AXIS: 78 DEGREES
EKG P-R INTERVAL: 138 MS
EKG Q-T INTERVAL: 426 MS
EKG QRS DURATION: 94 MS
EKG QTC CALCULATION (BAZETT): 396 MS
EKG R AXIS: 53 DEGREES
EKG T AXIS: 63 DEGREES
EKG VENTRICULAR RATE: 52 BPM
ESTIMATED AVERAGE GLUCOSE: 134 MG/DL
HBA1C MFR BLD: 6.3 % (ref 4–6)
HCT VFR BLD CALC: 41.8 % (ref 36.3–47.1)
HDLC SERPL-MCNC: 52 MG/DL
HEMOGLOBIN: 13.4 G/DL (ref 11.9–15.1)
LDL CHOLESTEROL: 133 MG/DL (ref 0–130)
LV EF: 52 %
LVEF MODALITY: NORMAL
MCH RBC QN AUTO: 30.3 PG (ref 25.2–33.5)
MCHC RBC AUTO-ENTMCNC: 32.1 G/DL (ref 28.4–34.8)
MCV RBC AUTO: 94.6 FL (ref 82.6–102.9)
NRBC AUTOMATED: 0 PER 100 WBC
PDW BLD-RTO: 13.3 % (ref 11.8–14.4)
PLATELET # BLD: 240 K/UL (ref 138–453)
PMV BLD AUTO: 8.8 FL (ref 8.1–13.5)
RBC # BLD: 4.42 M/UL (ref 3.95–5.11)
TRIGL SERPL-MCNC: 106 MG/DL
VLDLC SERPL CALC-MCNC: ABNORMAL MG/DL (ref 1–30)
WBC # BLD: 5.2 K/UL (ref 3.5–11.3)

## 2021-09-04 PROCEDURE — G0378 HOSPITAL OBSERVATION PER HR: HCPCS

## 2021-09-04 PROCEDURE — 99217 PR OBSERVATION CARE DISCHARGE MANAGEMENT: CPT | Performed by: PSYCHIATRY & NEUROLOGY

## 2021-09-04 PROCEDURE — 70551 MRI BRAIN STEM W/O DYE: CPT

## 2021-09-04 PROCEDURE — 2580000003 HC RX 258: Performed by: STUDENT IN AN ORGANIZED HEALTH CARE EDUCATION/TRAINING PROGRAM

## 2021-09-04 PROCEDURE — 6370000000 HC RX 637 (ALT 250 FOR IP): Performed by: STUDENT IN AN ORGANIZED HEALTH CARE EDUCATION/TRAINING PROGRAM

## 2021-09-04 PROCEDURE — 93010 ELECTROCARDIOGRAM REPORT: CPT | Performed by: INTERNAL MEDICINE

## 2021-09-04 PROCEDURE — 80061 LIPID PANEL: CPT

## 2021-09-04 PROCEDURE — 93356 MYOCRD STRAIN IMG SPCKL TRCK: CPT

## 2021-09-04 PROCEDURE — 93306 TTE W/DOPPLER COMPLETE: CPT

## 2021-09-04 PROCEDURE — 85027 COMPLETE CBC AUTOMATED: CPT

## 2021-09-04 PROCEDURE — 97161 PT EVAL LOW COMPLEX 20 MIN: CPT

## 2021-09-04 PROCEDURE — 83036 HEMOGLOBIN GLYCOSYLATED A1C: CPT

## 2021-09-04 RX ADMIN — SODIUM CHLORIDE, PRESERVATIVE FREE 10 ML: 5 INJECTION INTRAVENOUS at 09:40

## 2021-09-04 RX ADMIN — ASPIRIN 81 MG: 81 TABLET, CHEWABLE ORAL at 09:40

## 2021-09-04 ASSESSMENT — PAIN DESCRIPTION - DESCRIPTORS: DESCRIPTORS: DISCOMFORT

## 2021-09-04 ASSESSMENT — PAIN SCALES - GENERAL
PAINLEVEL_OUTOF10: 4
PAINLEVEL_OUTOF10: 0
PAINLEVEL_OUTOF10: 0

## 2021-09-04 ASSESSMENT — PAIN DESCRIPTION - LOCATION: LOCATION: BACK

## 2021-09-04 ASSESSMENT — PAIN DESCRIPTION - PAIN TYPE: TYPE: CHRONIC PAIN

## 2021-09-04 ASSESSMENT — PAIN DESCRIPTION - ORIENTATION: ORIENTATION: LOWER

## 2021-09-04 NOTE — PROGRESS NOTES
[facility])  Occupational Therapy Not Seen Note    DATE: 2021    NAME: Mike Figueroa  MRN: [id number]   : 1970      Patient not seen this date for Occupational Therapy due to:    Patient independent with ADLs and functional tasks with no acute OT needs. Will defer OT evaluation at this time. Please reorder OT if future needs arise.      Electronically signed by Alvino Garza OTR/L on 2021 at 1:00 PM

## 2021-09-04 NOTE — PROGRESS NOTES
[facility]   [address] [place] [address]    Progress Note             Date:   9/4/2021  Patient name:  Gurjit Jimenez  Date of admission:  9/3/2021  1:08 PM  MRN:   [id number]  Account:  [de-identified]  YOB: 1970  PCP:    Priscilla Purvis MD  Room:   [address] [place] [address]  Code Status:    Full Code    Chief Complaint:     Chief Complaint   Patient presents with    Numbness     left side for days, pt had part of the ceiling fall on her a week ago       Interval hx: The patient was seen and examined at bedside. Is vitally stable, alert and oriented x . No acute events overnight. Complaint of mild headache and dizziness as per RN    Unable to evaluate. Patient was attempted to be seen multiple times but she was not in the room. Patient was down in the MRI followed by subsequent echocardiogram.      Brief History of Present Illness: The patient is a 46 y.o. Non- / non  female who presents with Numbness (left side for days, pt had part of the ceiling fall on her a week ago)   and she is admitted to the hospital for the management of      The patient is a 46 y.o. female who presents with recurrent headaches and neck discomfort. Approximately 5-7 days ago, she had her ceiling fall on her head which was filled with water. She was able to get to a chair and sit down but does not recall what transpired next. Is unclear whether there was any LOC. She has been to the hospital.  Since that incident and had a CT head and cervical spine which was negative. CTA of the head neck did not show any acute process but did show focal stenosis of the right M2. She presented with numbness and left-sided weakness. Her current NIH stroke scale is possibly 1 for left upper extremity weakness, her MMSE is 26 out of 30 and she continues to have diffuse headaches 8 out of 10 in intensity with diffuse pressure. She has intermittent confusion and feels more forgetful.   She states that she is unable to remember her address and occasionally recall how to get home.     She states that she is currently having throbbing headaches with nausea and vomiting and occasional blurry vision. It starts on the left side of her head and progressive diffusely. She continues to have confusion and memory difficulties. She attempted to make a doctor's appointment spoke with triage nurse who recommended to proceed to the hospital.  She was able to perform serial sevens adequately but did have difficulty with immediate and delayed recall, 1 out of 3. There is no history of strokes or seizures and she quit smoking 3 weeks ago.     She does have a PMH of lumbar radiculopathy, fibromyalgia and chronic back discomfort. Baseline, she is functional and able to perform her daily activities without any impairment. Past Medical History:     Past Medical History:   Diagnosis Date    Allergic rhinitis     Asthma     Chronic back pain     on 8/10/18 pt denies pain mgmnt    Fibromyalgia     Lumbar radiculopathy     Osteoarthritis         Past Surgical History:     Past Surgical History:   Procedure Laterality Date     SECTION      DILATION AND CURETTAGE OF UTERUS      SAB        Medications Prior to Admission:     Prior to Admission medications    Medication Sig Start Date End Date Taking? Authorizing Provider   Lidocaine HCl (ASPERCREME W/LIDOCAINE) 4 % CREA Apply topically to affected areas 3 times daily as needed.  21   Savanna Escudero DO   ibuprofen (ADVIL;MOTRIN) 800 MG tablet Take 1 tablet by mouth every 8 hours as needed for Pain 21   Zoey Alvarado MD   cyclobenzaprine (FLEXERIL) 10 MG tablet Take 1 tablet by mouth 3 times daily as needed for Muscle spasms 21  Zoey Alvarado MD   permethrin (ELIMITE) 5 % cream Apply topically as directed 21   Lacie Shell MD   acetaminophen (APAP EXTRA STRENGTH) 500 MG tablet Take 2 tablets by mouth every 6 hours as needed for Pain 20   Day Cho DO        Allergies:     Morphine    Social History:     Tobacco:    reports that she has been smoking cigarettes. She has a 30.00 pack-year smoking history. She has never used smokeless tobacco.  Alcohol:      reports no history of alcohol use. Drug Use:  reports previous drug use. Family History:     Family History   Problem Relation Age of Onset    Breast Cancer Paternal Grandmother         unknown age dx   Danay Benitez Breast Cancer Paternal Aunt         unknown age dx   Danay Benitez Uterine Cancer Neg Hx     Colon Cancer Neg Hx     Ovarian Cancer Neg Hx        Review of Systems:     Review of Systems   Unable to perform ROS: Other (Not seen as she was MRI.)       Physical Exam:   /71   Pulse 58   Temp 97 °F (36.1 °C) (Oral)   Resp 19   Wt 200 lb (90.7 kg)   LMP 2020 (Approximate)   SpO2 94%   BMI 34.33 kg/m²   Temp (24hrs), Av °F (36.7 °C), Min:97 °F (36.1 °C), Max:98.6 °F (37 °C)    Recent Labs     21  1315   POCGLU 98       Intake/Output Summary (Last 24 hours) at 2021 0729  Last data filed at 9/3/2021 2031  Gross per 24 hour   Intake 1000 ml   Output --   Net 1000 ml         Neurologic Exam     Not seen as she is down in the MRI.   Investigations:      Laboratory Testing:  Recent Results (from the past 24 hour(s))   Venous Blood Gas, POC    Collection Time: 21  1:15 PM   Result Value Ref Range    pH, Scott 7.326 7.320 - 7.430    pCO2, Scott 56.9 (H) 41.0 - 51.0 mm Hg    pO2, Scott 25.7 (L) 30 - 50 mm Hg    HCO3, Venous 29.8 (H) 22.0 - 29.0 mmol/L    Total CO2, Venous NOT REPORTED 23.0 - 30.0 mmol/L    Negative Base Excess, Scott NOT REPORTED 0.0 - 2.0    Positive Base Excess, Scott 2 0.0 - 3.0    O2 Sat, Scott 41 (L) 60.0 - 85.0 %    O2 Device/Flow/% NOT REPORTED     Eren Test NOT REPORTED     Sample Site NOT REPORTED     Mode NOT REPORTED     FIO2 NOT REPORTED     Pt Temp NOT REPORTED     POC pH Temp NOT REPORTED     POC pCO2 Temp NOT REPORTED mm Hg POC pO2 Temp NOT REPORTED mm Hg   ELECTROLYTES PLUS    Collection Time: 09/03/21  1:15 PM   Result Value Ref Range    POC Sodium 148 (H) 138 - 146 mmol/L    POC Potassium 4.1 3.5 - 4.5 mmol/L    POC Chloride 111 (H) 98 - 107 mmol/L    POC TCO2 30 22 - 30 mmol/L    Anion Gap 8 7 - 16 mmol/L   Hemoglobin and hematocrit, blood    Collection Time: 09/03/21  1:15 PM   Result Value Ref Range    POC Hemoglobin 16.1 (H) 12.0 - 16.0 g/dL    POC Hematocrit 47 (H) 36 - 46 %   Creatinine W/GFR Point of Care    Collection Time: 09/03/21  1:15 PM   Result Value Ref Range    POC Creatinine 0.63 0.51 - 1.19 mg/dL    GFR Comment >60 >60 mL/min    GFR Non-African American >60 >60 mL/min    GFR Comment         CALCIUM, IONIC (POC)    Collection Time: 09/03/21  1:15 PM   Result Value Ref Range    POC Ionized Calcium 1.25 1.15 - 1.33 mmol/L   POCT urea (BUN)    Collection Time: 09/03/21  1:15 PM   Result Value Ref Range    POC BUN Result not available.  8 - 26 mg/dL   Lactic Acid, POC    Collection Time: 09/03/21  1:15 PM   Result Value Ref Range    POC Lactic Acid 1.16 0.56 - 1.39 mmol/L   POCT Glucose    Collection Time: 09/03/21  1:15 PM   Result Value Ref Range    POC Glucose 98 74 - 100 mg/dL   STROKE PANEL    Collection Time: 09/03/21  1:32 PM   Result Value Ref Range    Glucose 100 (H) 70 - 99 mg/dL    BUN 16 6 - 20 mg/dL    CREATININE 0.77 0.50 - 0.90 mg/dL    Bun/Cre Ratio NOT REPORTED 9 - 20    Calcium 9.4 8.6 - 10.4 mg/dL    Sodium 144 135 - 144 mmol/L    Potassium 4.2 3.7 - 5.3 mmol/L    Chloride 107 98 - 107 mmol/L    CO2 23 20 - 31 mmol/L    Anion Gap 14 9 - 17 mmol/L    GFR Non-African American >60 >60 mL/min    GFR African American >60 >60 mL/min    GFR Comment          GFR Staging NOT REPORTED     WBC 5.9 3.5 - 11.3 k/uL    RBC 4.94 3.95 - 5.11 m/uL    Hemoglobin 14.7 11.9 - 15.1 g/dL    Hematocrit 45.7 36.3 - 47.1 %    MCV 92.5 82.6 - 102.9 fL    MCH 29.8 25.2 - 33.5 pg    MCHC 32.2 28.4 - 34.8 g/dL    RDW 13.1 11.8 - 14.4 %    Platelets 848 199 - 608 k/uL    MPV 9.2 8.1 - 13.5 fL    NRBC Automated 0.0 0.0 per 100 WBC    Total  26 - 192 U/L    CK-MB 1.6 <5.4 ng/mL    % CKMB 1.2 0.0 - 3.0 %    CKMB Interpretation NORMAL ISOENZYME PATTERN     Differential Type NOT REPORTED     WBC Morphology NOT REPORTED     RBC Morphology NOT REPORTED     Platelet Estimate NOT REPORTED     Seg Neutrophils 50 36 - 65 %    Lymphocytes 39 24 - 43 %    Monocytes 8 3 - 12 %    Eosinophils % 2 1 - 4 %    Basophils 1 0 - 2 %    Immature Granulocytes 0 0 %    Segs Absolute 2.95 1.50 - 8.10 k/uL    Absolute Lymph # 2.30 1.10 - 3.70 k/uL    Absolute Mono # 0.48 0.10 - 1.20 k/uL    Absolute Eos # 0.14 0.00 - 0.44 k/uL    Basophils Absolute 0.05 0.00 - 0.20 k/uL    Absolute Immature Granulocyte <0.03 0.00 - 0.30 k/uL    Myoglobin <21 (L) 25 - 58 ng/mL    Protime 10.1 9.1 - 12.3 sec    INR 0.9     PTT 23.0 20.5 - 30.5 sec    Troponin, High Sensitivity <6 0 - 14 ng/L    Troponin T NOT REPORTED <0.03 ng/mL    Troponin Interp NOT REPORTED    EKG 12 Lead    Collection Time: 09/03/21  2:50 PM   Result Value Ref Range    Ventricular Rate 52 BPM    Atrial Rate 52 BPM    P-R Interval 138 ms    QRS Duration 94 ms    Q-T Interval 426 ms    QTc Calculation (Bazett) 396 ms    P Axis 78 degrees    R Axis 53 degrees    T Axis 63 degrees   COVID-19, Rapid    Collection Time: 09/03/21  7:11 PM    Specimen: Nasopharyngeal Swab   Result Value Ref Range    Specimen Description . NASOPHARYNGEAL SWAB     SARS-CoV-2, Rapid Not Detected Not Detected   TSH without Reflex    Collection Time: 09/03/21  8:55 PM   Result Value Ref Range    TSH 5.44 (H) 0.30 - 5.00 mIU/L   CBC    Collection Time: 09/04/21  6:50 AM   Result Value Ref Range    WBC 5.2 3.5 - 11.3 k/uL    RBC 4.42 3.95 - 5.11 m/uL    Hemoglobin 13.4 11.9 - 15.1 g/dL    Hematocrit 41.8 36.3 - 47.1 %    MCV 94.6 82.6 - 102.9 fL    MCH 30.3 25.2 - 33.5 pg    MCHC 32.1 28.4 - 34.8 g/dL    RDW 13.3 11.8 - 14.4 % Platelets 470 022 - 325 k/uL    MPV 8.8 8.1 - 13.5 fL    NRBC Automated 0.0 0.0 per 100 WBC   LIPID PANEL    Collection Time: 09/04/21  6:50 AM   Result Value Ref Range    Cholesterol 206 (H) <200 mg/dL    HDL 52 >40 mg/dL    LDL Cholesterol 133 (H) 0 - 130 mg/dL    Chol/HDL Ratio 4.0 <5    Triglycerides 106 <150 mg/dL    VLDL NOT REPORTED 1 - 30 mg/dL     Recent Labs     09/04/21  0650   WBC 5.2   RBC 4.42   HGB 13.4   HCT 41.8   MCV 94.6   MCH 30.3   MCHC 32.1   RDW 13.3      MPV 8.8     Recent Labs     09/03/21  1332      K 4.2      CO2 23   BUN 16   CREATININE 0.77   GLUCOSE 100*   CALCIUM 9.4     No results found for: LABA1C    Assessment :      Primary Problem  <principal problem not specified>    Active Hospital Problems    Diagnosis Date Noted    Post-concussion headache [G44.309] 09/03/2021    Stroke-like symptoms [R29.90]     Left arm weakness [R29.898]        Patient is a 46 y.o. female     Plan:     46 y.o. female who presents with recurrent headaches and neck discomfort after the ceiling on her home and subsequently hit her head. Unclear if there was any LOC and patient has been having constant throbbing pain radiating to her neck. CT head and CT cervical spine along with CTA did not show any LVO or acute process. Neurologically, she is intact and does not display any focal deficit except for subtle upper extremity weakness. She has been on Flexeril which has not been very beneficial.  We will proceed with a migraine cocktail as this did not appear to be an ischemic stroke, we will also proceed with an MRI to rule out other etiologies.   Most likely this postconcussive syndrome that occurs with recurrent headaches, forgetfulness and intermittent periods of confusion.        Neurological-postconcussive headaches  -Continue to monitor for any neurological change.  -Stroke panel in the ED unremarkable.  -Lipid profile:  and cholesterol 206  -Aspirin 81 mg  -MRI stroke protocol completed Pending report.   -Amitriptyline 25 mg nightly  -We will proceed with a migraine cocktail including Benadryl and Compazine/Toradol  -Diet. Patient passed swallow study. -DVT prophylaxis. -CTA-head and neck right to n.p.o. stenosis  -PT/OT awaiting recommendations  -Consult to         Please contact [facility] with any changes in patients neurologic status. Follow-up further recommendations after discussing the case with attending  The plan was discussed with the patient, patient's family and the medical staff. Consultations:   IP CONSULT TO STROKE TEAM    Patient is admitted as inpatient status because of co-morbidities listed above, severity of signs and symptoms as outlined, requirement for current medical therapies and most importantly because of direct risk to patient if care not provided in a hospital setting.     Jocelyn Garza MD  9/4/2021  7:29 AM    Copy sent to Dr. Tray Macario MD

## 2021-09-04 NOTE — DISCHARGE SUMMARY
the hospital. Marvin Blanchard was able to perform serial sevens adequately but did have difficulty with immediate and delayed recall, 1 out of 3.  There is no history of strokes or seizures and she quit smoking 3 weeks ago.     She does have a PMH of lumbar radiculopathy, fibromyalgia and chronic back discomfort.  Baseline, she is functional and able to perform her daily activities without any impairment. On day of discharge, patient was clinically and vitally stable. MRI was unremarkable. Echo was 52% with bubble study revealed negative. Patient is stable from neurological standpoint to be discharged. Procedures: None    Any Hospital Acquired Infections: none    Discharge Functional Status:  stable    Disposition: home    Patient Instructions: Follow-up with primary care physician in 2 weeks and neurology in 6 weeks        Discharge Medications:  Discharge Medication List as of 9/4/2021  1:09 PM      CONTINUE these medications which have NOT CHANGED    Details   Lidocaine HCl (ASPERCREME W/LIDOCAINE) 4 % CREA Apply topically to affected areas 3 times daily as needed. , Disp-49 g, R-0Print      ibuprofen (ADVIL;MOTRIN) 800 MG tablet Take 1 tablet by mouth every 8 hours as needed for Pain, Disp-30 tablet, R-0Print      cyclobenzaprine (FLEXERIL) 10 MG tablet Take 1 tablet by mouth 3 times daily as needed for Muscle spasms, Disp-20 tablet, R-0Print      permethrin (ELIMITE) 5 % cream Apply topically as directed, Disp-1 Tube, R-0, Normal      acetaminophen (APAP EXTRA STRENGTH) 500 MG tablet Take 2 tablets by mouth every 6 hours as needed for Pain, Disp-30 tablet, R-0Print         STOP taking these medications       PARoxetine (PAXIL) 10 MG tablet Comments:   Reason for Stopping:               Activity: activity as tolerated    Restrictions: Driving No, Swimming No, Operating heavy machinery No, Compromising heights No    Diet: regular diet    Follow-up:    Ramin Rios MD  [address] [place] [address] follow up. Scattered paranasal sinus disease with a large right maxillary mucus. Jenny Guevara MD  [address]  [phone]    In 6 weeks  Post hospital follow up.  Post concsussion syndrome      Follow up labs: None    Follow up imaging: None    Note that over 30 minutes was spent in preparing discharge papers, discussing discharge with patient, medication review, etc.      Jenny Guevara MD,  Department of Neurology  [place], [place]  9/4/2021, 3:58 PM

## 2021-09-04 NOTE — H&P
Neurology H &P Note       History Obtained From:  patient    CHIEF COMPLAINT:       Headaches    HISTORY OF PRESENT ILLNESS:       The patient is a 46 y.o. female who presents with recurrent headaches and neck discomfort. Approximately 5-7 days ago, she had her ceiling fall on her head which was filled with water. She was able to get to a chair and sit down but does not recall what transpired next. Is unclear whether there was any LOC. She has been to the hospital.  Since that incident and had a CT head and cervical spine which was negative. CTA of the head neck did not show any acute process but did show focal stenosis of the right M2. She presented with numbness and left-sided weakness. Her current NIH stroke scale is possibly 1 for left upper extremity weakness, her MMSE is 26 out of 30 and she continues to have diffuse headaches 8 out of 10 in intensity with diffuse pressure. She has intermittent confusion and feels more forgetful. She states that she is unable to remember her address and occasionally recall how to get home. She states that she is currently having throbbing headaches with nausea and vomiting and occasional blurry vision. It starts on the left side of her head and progressive diffusely. She continues to have confusion and memory difficulties. She attempted to make a doctor's appointment spoke with triage nurse who recommended to proceed to the hospital.  She was able to perform serial sevens adequately but did have difficulty with immediate and delayed recall, 1 out of 3. There is no history of strokes or seizures and she quit smoking 3 weeks ago. She does have a PMH of lumbar radiculopathy, fibromyalgia and chronic back discomfort. Baseline, she is functional and able to perform her daily activities without any impairment.     PAST MEDICAL HISTORY :       Past Medical History:        Diagnosis Date    Allergic rhinitis     Asthma     Chronic back pain     on 8/10/18 pt denies pain mgmnt    Fibromyalgia     Lumbar radiculopathy     Osteoarthritis        Past Surgical History:        Procedure Laterality Date     SECTION      DILATION AND CURETTAGE OF UTERUS      SAB       Social History:   Social History     Socioeconomic History    Marital status: Single     Spouse name: Not on file    Number of children: Not on file    Years of education: Not on file    Highest education level: Not on file   Occupational History    Not on file   Tobacco Use    Smoking status: Current Every Day Smoker     Packs/day: 1.00     Years: 30.00     Pack years: 30.00     Types: Cigarettes    Smokeless tobacco: Never Used   Vaping Use    Vaping Use: Former   Substance and Sexual Activity    Alcohol use: No    Drug use: Not Currently     Comment: Edgar  2 x week    Sexual activity: Yes     Partners: Male   Other Topics Concern    Not on file   Social History Narrative    Not on file     Social Determinants of Health     Financial Resource Strain:     Difficulty of Paying Living Expenses:    Food Insecurity:     Worried About Running Out of Food in the Last Year:     Ran Out of Food in the Last Year:    Transportation Needs:     Lack of Transportation (Medical):      Lack of Transportation (Non-Medical):    Physical Activity:     Days of Exercise per Week:     Minutes of Exercise per Session:    Stress:     Feeling of Stress :    Social Connections:     Frequency of Communication with Friends and Family:     Frequency of Social Gatherings with Friends and Family:     Attends Taoism Services:     Active Member of Clubs or Organizations:     Attends Club or Organization Meetings:     Marital Status:    Intimate Partner Violence:     Fear of Current or Ex-Partner:     Emotionally Abused:     Physically Abused:     Sexually Abused:        Family History:       Problem Relation Age of Onset    Breast Cancer Paternal Grandmother unknown age dx   Livia See Breast Cancer Paternal Aunt         unknown age dx   Livia See Uterine Cancer Neg Hx     Colon Cancer Neg Hx     Ovarian Cancer Neg Hx        Allergies:  Morphine    Home Medications:  Prior to Admission medications    Medication Sig Start Date End Date Taking? Authorizing Provider   Lidocaine HCl (ASPERCREME W/LIDOCAINE) 4 % CREA Apply topically to affected areas 3 times daily as needed.  8/31/21   Sophia Heredia DO   ibuprofen (ADVIL;MOTRIN) 800 MG tablet Take 1 tablet by mouth every 8 hours as needed for Pain 8/28/21   Uyen Nazario MD   cyclobenzaprine (FLEXERIL) 10 MG tablet Take 1 tablet by mouth 3 times daily as needed for Muscle spasms 8/28/21 9/7/21  Uyen Nazario MD   permethrin (ELIMITE) 5 % cream Apply topically as directed 1/12/21   Ari Corea MD   PARoxetine (PAXIL) 10 MG tablet Take 1 tablet by mouth daily  Patient not taking: Reported on 9/18/2020 6/2/20   Joelle Tejada DO   acetaminophen (APAP EXTRA STRENGTH) 500 MG tablet Take 2 tablets by mouth every 6 hours as needed for Pain 5/1/20   Sophia Heredia DO       Current Medications:   Current Facility-Administered Medications: diphenhydrAMINE (BENADRYL) injection 12.5 mg, 12.5 mg, IntraVENous, Q6H PRN  cyclobenzaprine (FLEXERIL) tablet 10 mg, 10 mg, Oral, TID PRN  PARoxetine (PAXIL) tablet 10 mg, 10 mg, Oral, Daily  sodium chloride flush 0.9 % injection 5-40 mL, 5-40 mL, IntraVENous, 2 times per day  sodium chloride flush 0.9 % injection 5-40 mL, 5-40 mL, IntraVENous, PRN  0.9 % sodium chloride infusion, 25 mL, IntraVENous, PRN  enoxaparin (LOVENOX) injection 40 mg, 40 mg, SubCUTAneous, Daily  ondansetron (ZOFRAN-ODT) disintegrating tablet 4 mg, 4 mg, Oral, Q8H PRN **OR** ondansetron (ZOFRAN) injection 4 mg, 4 mg, IntraVENous, Q6H PRN  polyethylene glycol (GLYCOLAX) packet 17 g, 17 g, Oral, Daily PRN  acetaminophen (TYLENOL) tablet 650 mg, 650 mg, Oral, Q6H PRN **OR** acetaminophen (TYLENOL) suppository 650 mg, 650 mg, Rectal, Q6H PRN  aspirin chewable tablet 81 mg, 81 mg, Oral, Daily  prochlorperazine (COMPAZINE) injection 10 mg, 10 mg, IntraVENous, Once  diphenhydrAMINE (BENADRYL) injection 25 mg, 25 mg, IntraVENous, Once  amitriptyline (ELAVIL) tablet 25 mg, 25 mg, Oral, Nightly    REVIEW OF SYSTEMS:       CONSTITUTIONAL: negative for fatigue and malaise   EYES:  + For blurry vision and photo and phonophobia   HEENT: negative for tinnitus and sore throat   RESPIRATORY: negative for cough, shortness of breath   CARDIOVASCULAR: negative for chest pain, palpitations   GASTROINTESTINAL:  + For nausea/vomiting   GENITOURINARY: negative for incontinence   MUSCULOSKELETAL:  + For neck and back pain   NEUROLOGICAL: negative for seizures, + for recurrent headaches   PSYCHIATRIC: negative for fatigue     Review of systems otherwise negative. PHYSICAL EXAM:       /71   Pulse 58   Temp 97 °F (36.1 °C) (Oral)   Resp 19   Wt 200 lb (90.7 kg)   LMP 02/03/2020 (Approximate)   SpO2 94%   BMI 34.33 kg/m²     CONSTITUTIONAL:  Well developed, well nourished, alert and oriented x 3, in no acute distress. GCS 15, nontoxic.   No dysarthria or aphasia   HEAD:  normocephalic, atraumatic    EYES:  PERRLA, EOMI. no deviation   ENT:  moist mucous membranes   NECK:  supple, symmetric, no midline tenderness to palpation    BACK:  without midline tenderness, step-offs or deformities    LUNGS:  Equal air entry bilaterally   CARDIOVASCULAR:  normal s1 / s2   ABDOMEN:  Soft, no rigidity   NEUROLOGIC:  Mental Status:  A & O x3,awake             Cranial Nerves:    cranial nerves II-XII are grossly intact    Motor Exam:    Drift:  absent  Tone:  normal    Motor exam is 5 out of 5 all extremities with the exception of left upper extremity weakness 4/5 with extension flexion at the elbow and wrist.    Sensory:    Touch:    Right Upper Extremity:  normal  Left Upper Extremity:  normal  Right Lower Extremity:  normal  Left Lower Extremity: normal    Deep Tendon Reflexes:    Right Bicep:  2+  Left Bicep:  2+  Right Knee:  2+  Left Knee:  2+    Plantar Response:  Right:  downgoing  Left:  downgoing    Clonus:  absent  Girard's:  absent    Coordination/Dysmetria:  Heel to Shin:  Right:  normal  Left:  normal  Finger to Nose:   Right:  normal  Left:  normal   Dysdiadochokinesia:  absent    Gait:  deferred    INITIAL NIH STROKE SCALE:    Time Performed:  1639 PM     1a. Level of consciousness:  0 - alert; keenly responsive  1b. Level of consciousness questions:  0 - answers both questions correctly  1c. Level of consciousness questions:  0 - performs both tasks correctly  2. Best Gaze:  0 - normal  3. Visual:  0 - no visual loss  4. Facial Palsy:  0 - normal symmetric movement  5a. Motor left arm:  0 - no drift, limb holds 90 (or 45) degrees for full 10 seconds  5b. Motor right arm:  0 - no drift, limb holds 90 (or 45) degrees for full 10 seconds  6a. Motor left le - no drift; leg holds 30 degree position for full 5 seconds  6b. Motor right le - no drift; leg holds 30 degree position for full 5 seconds  7. Limb Ataxia:  0 - absent  8. Sensory:  0 - normal; no sensory loss  9. Best Language:  0 - no aphasia, normal  10. Dysarthria:  0 - normal  11.   Extinction and Inattention:  0 - no abnormality    TOTAL:  1     SKIN:  no rash      LABS AND IMAGING:     CBC with Differential:    Lab Results   Component Value Date    WBC 5.9 2021    RBC 4.94 2021    HGB 14.7 2021    HCT 45.7 2021     2021    MCV 92.5 2021    MCH 29.8 2021    MCHC 32.2 2021    RDW 13.1 2021    LYMPHOPCT 39 2021    MONOPCT 8 2021    BASOPCT 1 2021    MONOSABS 0.48 2021    LYMPHSABS 2.30 2021    EOSABS 0.14 2021    BASOSABS 0.05 2021    DIFFTYPE NOT REPORTED 2021     BMP:    Lab Results   Component Value Date     2021    K 4.2 2021  09/03/2021    CO2 23 09/03/2021    BUN 16 09/03/2021    LABALBU 4.4 11/01/2018    CREATININE 0.77 09/03/2021    CREATININE 0.63 09/03/2021    CALCIUM 9.4 09/03/2021    GFRAA >60 09/03/2021    LABGLOM >60 09/03/2021    GLUCOSE 100 09/03/2021       Radiology Review: CT head negative for any acute process      ASSESSMENT AND PLAN:       Patient Active Problem List   Diagnosis    Chronic back pain    Neck pain    Asthma    Family history of breast cancer    Hypertrophy of lingual tonsil    Abnormal computed tomography scan    Smoker    Lumbar radiculopathy    Post-concussion headache       46 y.o. female who presents with recurrent headaches and neck discomfort after the ceiling on her home and subsequently hit her head. Unclear if there was any LOC and patient has been having constant throbbing pain radiating to her neck. CT head and CT cervical spine along with CTA did not show any LVO or acute process. Neurologically, she is intact and does not display any focal deficit except for subtle upper extremity weakness. She has been on Flexeril which has not been very beneficial.  We will proceed with a migraine cocktail as this did not appear to be an ischemic stroke, we will also proceed with an MRI to rule out other etiologies. Most likely this postconcussive syndrome that occurs with recurrent headaches, forgetfulness and intermittent periods of confusion. Neurological-postconcussive headaches  -Continue to monitor for any neurological change.  -Stroke work-up pending.  -Aspirin 81 mg  -MRI stroke protocol pending  -Amitriptyline 25 mg nightly  -We will proceed with a migraine cocktail including Benadryl and Compazine/Toradol  -P.o. diet as tolerating  -DVT prophylaxis  -CTA-head and neck right to n.p.o. stenosis  -We will admit under observation and reevaluate in a.m. after additional imaging studies. Please contact [facility] with any changes in patients neurologic status.            Hodan Lang Genia Klinefelter, MD   9/3/2021  9:19 PM

## 2021-09-04 NOTE — PROGRESS NOTES
Good  Sitting - Dynamic: Good  Standing - Static: Good  Standing - Dynamic: Good  Comments: standing balance assessed w/ no AD; pt able to sit EOB independently        Plan   Plan  Plan Comment: D/C skilled PT. Pt presents with no skilled physical therapy needs at this time.   Safety Devices  Type of devices: Left in bed, Call light within reach, Gait belt, Nurse notified  Restraints  Initially in place: No    AM-PAC Score     AM-PAC Inpatient Mobility without Stair Climbing Raw Score : 20 (09/04/21 1301)  AM-PAC Inpatient without Stair Climbing T-Scale Score : 60.57 (09/04/21 1301)  Mobility Inpatient CMS 0-100% Score: 0 (09/04/21 1301)  Mobility Inpatient without Stair CMS G-Code Modifier : [address] (09/04/21 1301)    Therapy Time   Individual Concurrent Group Co-treatment   Time In 1139         Time Out [address]         Minutes 10                 Marisela Figueroa, PT

## 2021-09-09 ENCOUNTER — HOSPITAL ENCOUNTER (OUTPATIENT)
Age: 51
Setting detail: SPECIMEN
Discharge: HOME | End: 2021-09-09
Payer: COMMERCIAL

## 2021-09-09 ENCOUNTER — OFFICE VISIT (OUTPATIENT)
Dept: FAMILY MEDICINE CLINIC | Age: 51
End: 2021-09-09
Payer: COMMERCIAL

## 2021-09-09 VITALS
SYSTOLIC BLOOD PRESSURE: 123 MMHG | HEART RATE: 64 BPM | DIASTOLIC BLOOD PRESSURE: 81 MMHG | WEIGHT: 181 LBS | BODY MASS INDEX: 31.07 KG/M2

## 2021-09-09 DIAGNOSIS — J45.20 MILD INTERMITTENT ASTHMA, UNSPECIFIED WHETHER COMPLICATED: ICD-10-CM

## 2021-09-09 DIAGNOSIS — Z11.59 NEED FOR HEPATITIS C SCREENING TEST: ICD-10-CM

## 2021-09-09 DIAGNOSIS — M54.2 NECK PAIN: ICD-10-CM

## 2021-09-09 DIAGNOSIS — F07.81 POST CONCUSSION SYNDROME: Primary | ICD-10-CM

## 2021-09-09 DIAGNOSIS — F17.200 SMOKING: ICD-10-CM

## 2021-09-09 DIAGNOSIS — Z11.4 ENCOUNTER FOR SCREENING FOR HIV: ICD-10-CM

## 2021-09-09 LAB
HEPATITIS C ANTIBODY: NONREACTIVE
HIV AG/AB: NONREACTIVE

## 2021-09-09 PROCEDURE — G8427 DOCREV CUR MEDS BY ELIG CLIN: HCPCS

## 2021-09-09 PROCEDURE — 99213 OFFICE O/P EST LOW 20 MIN: CPT

## 2021-09-09 PROCEDURE — 4004F PT TOBACCO SCREEN RCVD TLK: CPT

## 2021-09-09 PROCEDURE — 3017F COLORECTAL CA SCREEN DOC REV: CPT

## 2021-09-09 PROCEDURE — 1111F DSCHRG MED/CURRENT MED MERGE: CPT

## 2021-09-09 PROCEDURE — G8417 CALC BMI ABV UP PARAM F/U: HCPCS

## 2021-09-09 RX ORDER — ALBUTEROL SULFATE 90 UG/1
2 AEROSOL, METERED RESPIRATORY (INHALATION) 4 TIMES DAILY PRN
Qty: 54 G | Refills: 1 | Status: SHIPPED | OUTPATIENT
Start: 2021-09-09

## 2021-09-09 RX ORDER — TIZANIDINE 4 MG/1
4 TABLET ORAL EVERY 8 HOURS PRN
Qty: 30 TABLET | Refills: 1 | Status: SHIPPED | OUTPATIENT
Start: 2021-09-09 | End: 2021-10-04

## 2021-09-09 ASSESSMENT — PATIENT HEALTH QUESTIONNAIRE - PHQ9
2. FEELING DOWN, DEPRESSED OR HOPELESS: 0
SUM OF ALL RESPONSES TO PHQ QUESTIONS 1-9: 0
SUM OF ALL RESPONSES TO PHQ9 QUESTIONS 1 & 2: 0
SUM OF ALL RESPONSES TO PHQ QUESTIONS 1-9: 0
SUM OF ALL RESPONSES TO PHQ QUESTIONS 1-9: 0
1. LITTLE INTEREST OR PLEASURE IN DOING THINGS: 0

## 2021-09-09 ASSESSMENT — ENCOUNTER SYMPTOMS
APNEA: 0
NAUSEA: 1
COUGH: 0
ABDOMINAL PAIN: 0
CHOKING: 0
VOMITING: 1
SHORTNESS OF BREATH: 0
BACK PAIN: 1

## 2021-09-09 NOTE — PROGRESS NOTES
Attending Physician Statement  I have discussed the care of Prince Lunsford 46 y.o. female, including pertinent history and exam findings, with the resident Dr. Greg Salcedo MD.    History and Exam:   Chief Complaint   Patient presents with    Follow-Up from Hospital       Past Medical History:   Diagnosis Date    Allergic rhinitis     Asthma     Chronic back pain     on 8/10/18 pt denies pain mgmnt    Fibromyalgia     Lumbar radiculopathy     Osteoarthritis      Allergies   Allergen Reactions    Morphine Anxiety and Other (See Comments)     Pt stated that the morphine gave her next pain and made her feel unwell       I have seen and examined the patient and the key elements of the encounter have been performed by me. BP Readings from Last 3 Encounters:   09/13/21 125/78   09/09/21 123/81   09/04/21 (!) 146/67     /81 (Site: Right Upper Arm, Position: Sitting, Cuff Size: Medium Adult)   Pulse 64   Wt 181 lb (82.1 kg)   LMP 02/03/2020 (Approximate)   BMI 31.07 kg/m²   Lab Results   Component Value Date    WBC 5.2 09/04/2021    HGB 13.4 09/04/2021    HCT 41.8 09/04/2021     09/04/2021    CHOL 206 (H) 09/04/2021    TRIG 106 09/04/2021    HDL 52 09/04/2021    ALT 11 11/01/2018    AST 17 11/01/2018     09/03/2021    K 4.2 09/03/2021     09/03/2021    CREATININE 0.77 09/03/2021    BUN 16 09/03/2021    CO2 23 09/03/2021    TSH 5.44 (H) 09/03/2021    INR 0.9 09/03/2021    LABA1C 6.3 (H) 09/04/2021     Lab Results   Component Value Date    LABALBU 4.4 11/01/2018     No results found for: IRON, TIBC, FERRITIN  Lab Results   Component Value Date    LDLCHOLESTEROL 133 (H) 09/04/2021     I agree with the assessment, plan and the diagnosis of    Diagnosis Orders   1. Post concussion syndrome  IA DISCHARGE MEDS RECONCILED W/ CURRENT OUTPATIENT MED LIST   2. Neck pain  tiZANidine (ZANAFLEX) 4 MG tablet   3.  Mild intermittent asthma, unspecified whether complicated  albuterol sulfate HFA (VENTOLIN HFA) 108 (90 Base) MCG/ACT inhaler   4. Smoking  Low Dose Chest CT -Abnormal Lung Screen Follow up   5. Encounter for screening for HIV  HIV Screen   6. Need for hepatitis C screening test  Hepatitis C Antibody    . I agree with orders as documented by the resident. More than 25 minutes spent  in face to face encounter with the patient and more than half in counseling. Patient's questions were answered. Patient Voiced understanding to the counseling. Return in 3 months (on 12/9/2021).    (GC Modifier)-Dr. Lavinia Mabry MD

## 2021-09-09 NOTE — PROGRESS NOTES
Subjective:    Milvia Ching is a 46 y.o. female with  has a past medical history of Allergic rhinitis, Asthma, Chronic back pain, Fibromyalgia, Lumbar radiculopathy, and Osteoarthritis. Presented to the office today for:  Chief Complaint   Patient presents with    Follow-Up from Hospital       HPI     26-year-old female came to the clinic after hospital admission  Patient was initially admitted to the hospital after head trauma  After cileing fell on her head. Since then she has been having some headaches and recent memory loss with acute neurological deficits such as numbness and tingling in her left arm and some weakness reported initially as well. Patient was admitted under the neurology service and CT head and CT cervical spine were normal CT head showed some stenosis of the right side. Patient was worked up for stroke and was negative including a bubble study and echo was also fine as well as MRI of the brain. Patient today is complaining of some  Pain in right shoulder  Back of the neck  Left arm  Trouble concentrating  Memory issues   Chronic headaches which now are worse plus nausea w/ vomiting  Low appetite  Patient was not aware that she has a follow-up with neurology in October and I discussed information with her    Follow up with   Emigdio Carroll MD  [address]  [phone]       Hx of smoking 20 declan years  1PPD  Low-dose CT scan chest    Discussed cologuard      Review of Systems   Constitutional: Positive for activity change and appetite change. Negative for fever. Respiratory: Negative for apnea, cough, choking and shortness of breath. Cardiovascular: Negative for chest pain, palpitations and leg swelling. Gastrointestinal: Positive for nausea and vomiting. Negative for abdominal pain. Musculoskeletal: Positive for arthralgias, back pain, myalgias and neck pain. Neurological: Positive for weakness, numbness and headaches.  Negative for seizures. Psychiatric/Behavioral: Positive for decreased concentration. Negative for agitation. The patient has a   Family History   Problem Relation Age of Onset    Breast Cancer Paternal Grandmother         unknown age dx    Breast Cancer Paternal Aunt         unknown age dx   Melecio Boothe Uterine Cancer Neg Hx     Colon Cancer Neg Hx     Ovarian Cancer Neg Hx        Objective:    /81 (Site: Right Upper Arm, Position: Sitting, Cuff Size: Medium Adult)   Pulse 64   Wt 181 lb (82.1 kg)   LMP 02/03/2020 (Approximate)   BMI 31.07 kg/m²    BP Readings from Last 3 Encounters:   09/09/21 123/81   09/04/21 (!) 146/67   08/31/21 (!) 152/84       Physical Exam  Constitutional:       General: She is not in acute distress. Appearance: She is not ill-appearing, toxic-appearing or diaphoretic. HENT:      Nose: Nose normal. No congestion. Eyes:      General: No scleral icterus. Right eye: No discharge. Left eye: No discharge. Extraocular Movements: Extraocular movements intact. Conjunctiva/sclera: Conjunctivae normal.      Pupils: Pupils are equal, round, and reactive to light. Cardiovascular:      Rate and Rhythm: Normal rate and regular rhythm. Pulses: Normal pulses. Heart sounds: No murmur heard. Pulmonary:      Effort: Pulmonary effort is normal.      Breath sounds: Normal breath sounds. No wheezing. Abdominal:      Tenderness: There is no abdominal tenderness. There is no guarding. Musculoskeletal:         General: No swelling or tenderness. Normal range of motion. Right lower leg: No edema. Left lower leg: No edema. Skin:     General: Skin is warm. Coloration: Skin is not jaundiced. Findings: No erythema. Neurological:      Mental Status: She is alert and oriented to person, place, and time. Motor: No weakness.    Psychiatric:         Mood and Affect: Mood normal.         Behavior: Behavior normal.         Thought Content: Thought content normal.         Judgment: Judgment normal.         Lab Results   Component Value Date    WBC 5.2 09/04/2021    HGB 13.4 09/04/2021    HCT 41.8 09/04/2021     09/04/2021    CHOL 206 (H) 09/04/2021    TRIG 106 09/04/2021    HDL 52 09/04/2021    ALT 11 11/01/2018    AST 17 11/01/2018     09/03/2021    K 4.2 09/03/2021     09/03/2021    CREATININE 0.77 09/03/2021    BUN 16 09/03/2021    CO2 23 09/03/2021    TSH 5.44 (H) 09/03/2021    INR 0.9 09/03/2021    LABA1C 6.3 (H) 09/04/2021     Lab Results   Component Value Date    CALCIUM 9.4 09/03/2021     Lab Results   Component Value Date    LDLCHOLESTEROL 133 (H) 09/04/2021       Assessment and Plan:    1. Neck pain  - tiZANidine (ZANAFLEX) 4 MG tablet; Take 1 tablet by mouth every 8 hours as needed (for muscle soreness)  Dispense: 30 tablet; Refill: 1    2. Post concussion syndrome  -Head Imaging studies discussed with the patient  Follow-up with neurology in October 2021    3. Mild intermittent asthma, unspecified whether complicated  - albuterol sulfate HFA (VENTOLIN HFA) 108 (90 Base) MCG/ACT inhaler; Inhale 2 puffs into the lungs 4 times daily as needed for Wheezing  Dispense: 54 g; Refill: 1    4. Smoking  1 pack/day smoking history  - Low Dose Chest CT -Abnormal Lung Screen Follow up; Future    5. Encounter for screening for HIV  - HIV Screen; Future    6. Need for hepatitis C screening test  - Hepatitis C Antibody; Future          Requested Prescriptions     Signed Prescriptions Disp Refills    tiZANidine (ZANAFLEX) 4 MG tablet 30 tablet 1     Sig: Take 1 tablet by mouth every 8 hours as needed (for muscle soreness)    albuterol sulfate HFA (VENTOLIN HFA) 108 (90 Base) MCG/ACT inhaler 54 g 1     Sig: Inhale 2 puffs into the lungs 4 times daily as needed for Wheezing       There are no discontinued medications.     Katie Martinez received counseling on the following healthy behaviors: nutrition, exercise and medication adherence    Discussed use,benefit, and side effects of prescribed medications. Barriers to medication compliance addressed. All patient questions answered. Pt voiced understanding. Return in about 3 months (around 12/9/2021). Disclaimer: Some orall of this note was transcribed using voice-recognition software. This may cause typographical errors occasionally. Although all effort is made to fix these errors, please do not hesitate to contact our office if there Jannette Chanel concern with the understanding of this note. Post-Discharge Transitional Care Management Services or Hospital Follow Up      Ming Guardado   YOB: 1970    Date of Office Visit:  9/9/2021  Date of Hospital Admission: 9/3/21  Date of Hospital Discharge: 9/4/21  Risk of hospital readmission (high >=14%.  Medium >=10%) :No data recorded    Care management risk score Rising risk (score 2-5) and Complex Care (Scores >=6): 6     Non face to face  following discharge, date last encounter closed (first attempt may have been earlier): *No documented post hospital discharge outreach found in the last 14 days    Call initiated 2 business days of discharge: *No response recorded in the last 14 days    Patient Active Problem List   Diagnosis    Chronic back pain    Neck pain    Asthma    Family history of breast cancer    Hypertrophy of lingual tonsil    Abnormal computed tomography scan    Smoker    Lumbar radiculopathy    Post-concussion headache    Stroke-like symptoms    Left arm weakness    Post concussion syndrome       Allergies   Allergen Reactions    Morphine Anxiety and Other (See Comments)     Pt stated that the morphine gave her next pain and made her feel unwell        Medications listed as ordered at the time of discharge from hospital   [facility] Medication Instructions [id number]    Printed on:09/09/21 1710   Medication Information                      acetaminophen (APAP EXTRA STRENGTH) 500 MG tablet  Take 2 tablets by mouth every 6 hours as needed for Pain             albuterol sulfate HFA (VENTOLIN HFA) 108 (90 Base) MCG/ACT inhaler  Inhale 2 puffs into the lungs 4 times daily as needed for Wheezing             ibuprofen (ADVIL;MOTRIN) 800 MG tablet  Take 1 tablet by mouth every 8 hours as needed for Pain             Lidocaine HCl (ASPERCREME W/LIDOCAINE) 4 % CREA  Apply topically to affected areas 3 times daily as needed. permethrin (ELIMITE) 5 % cream  Apply topically as directed             tiZANidine (ZANAFLEX) 4 MG tablet  Take 1 tablet by mouth every 8 hours as needed (for muscle soreness)                   Medications marked \"taking\" at this time  Outpatient Medications Marked as Taking for the 9/9/21 encounter (Office Visit) with Cherl Frankel, MD   Medication Sig Dispense Refill    tiZANidine (ZANAFLEX) 4 MG tablet Take 1 tablet by mouth every 8 hours as needed (for muscle soreness) 30 tablet 1    albuterol sulfate HFA (VENTOLIN HFA) 108 (90 Base) MCG/ACT inhaler Inhale 2 puffs into the lungs 4 times daily as needed for Wheezing 54 g 1    ibuprofen (ADVIL;MOTRIN) 800 MG tablet Take 1 tablet by mouth every 8 hours as needed for Pain 30 tablet 0    acetaminophen (APAP EXTRA STRENGTH) 500 MG tablet Take 2 tablets by mouth every 6 hours as needed for Pain 30 tablet 0        Medications patient taking as of now reconciled against medications ordered at time of hospital discharge: Yes    Chief Complaint   Patient presents with    Follow-Up from Hospital       History of Present illness - Follow up of Hospital diagnosis(es):     Inpatient course: Discharge summary reviewed- see chart. Interval history/Current status:     A comprehensive review of systems was negative except for what was noted in the HPI.     Vitals:    09/09/21 1519   BP: 123/81   Site: Right Upper Arm   Position: Sitting   Cuff Size: Medium Adult   Pulse: 64   Weight: 181 lb (82.1 kg)     Body mass

## 2021-09-09 NOTE — PROGRESS NOTES
Visit Information    Have you changed or started any medications since your last visit including any over-the-counter medicines, vitamins, or herbal medicines? no   Have you stopped taking any of your medications? Is so, why? -  no  Are you having any side effects from any of your medications? - no    Have you seen any other physician or provider since your last visit?  no   Have you had any other diagnostic tests since your last visit? yes - at [facility]   Have you been seen in the emergency room and/or had an admission in a hospital since we last saw you?  yes - at [facility]   Have you had your routine dental cleaning in the past 6 months?  no     Do you have an active MyChart account? If no, what is the barrier?   Yes    No care team member to display    Medical History Review  Past Medical, Family, and Social History reviewed and does not contribute to the patient presenting condition    Health Maintenance   Topic Date Due    Hepatitis C screen  Never done    COVID-19 Vaccine (1) Never done    HIV screen  Never done    Colon cancer screen colonoscopy  Never done    Shingles Vaccine (1 of 2) Never done    Low dose CT lung screening  Never done    Flu vaccine (1) 09/01/2021    Breast cancer screen  07/02/2022    A1C test (Diabetic or Prediabetic)  09/04/2022    Cervical cancer screen  06/02/2025    Lipid screen  09/04/2026    DTaP/Tdap/Td vaccine (2 - Td or Tdap) 11/01/2028    Pneumococcal 0-64 years Vaccine (2 of 2 - PPSV23) 05/20/2033    Hepatitis A vaccine  Aged Out    Hepatitis B vaccine  Aged Out    Hib vaccine  Aged Out    Meningococcal (ACWY) vaccine  Aged Out

## 2021-09-13 VITALS
HEART RATE: 79 BPM | BODY MASS INDEX: 29.88 KG/M2 | RESPIRATION RATE: 18 BRPM | SYSTOLIC BLOOD PRESSURE: 125 MMHG | TEMPERATURE: 98.7 F | DIASTOLIC BLOOD PRESSURE: 78 MMHG | OXYGEN SATURATION: 98 % | WEIGHT: 175 LBS | HEIGHT: 64 IN

## 2021-09-13 PROCEDURE — 99282 EMERGENCY DEPT VISIT SF MDM: CPT

## 2021-09-13 ASSESSMENT — PAIN SCALES - GENERAL: PAINLEVEL_OUTOF10: 8

## 2021-09-14 ENCOUNTER — HOSPITAL ENCOUNTER (EMERGENCY)
Age: 51
Discharge: HOME | End: 2021-09-14
Attending: EMERGENCY MEDICINE
Payer: COMMERCIAL

## 2021-09-14 DIAGNOSIS — J06.9 VIRAL URI: Primary | ICD-10-CM

## 2021-09-14 LAB
SARS-COV-2: ABNORMAL
SARS-COV-2: DETECTED
SOURCE: ABNORMAL

## 2021-09-14 PROCEDURE — U0003 INFECTIOUS AGENT DETECTION BY NUCLEIC ACID (DNA OR RNA); SEVERE ACUTE RESPIRATORY SYNDROME CORONAVIRUS 2 (SARS-COV-2) (CORONAVIRUS DISEASE [COVID-19]), AMPLIFIED PROBE TECHNIQUE, MAKING USE OF HIGH THROUGHPUT TECHNOLOGIES AS DESCRIBED BY CMS-2020-01-R: HCPCS

## 2021-09-14 PROCEDURE — U0005 INFEC AGEN DETEC AMPLI PROBE: HCPCS

## 2021-09-14 NOTE — ED PROVIDER NOTES
EMERGENCY DEPARTMENT ENCOUNTER    Pt Name: Amparo Osman  MRN: [id number]  [place] 1970  Date of evaluation: 21  CHIEF COMPLAINT       Chief Complaint   Patient presents with    Concern For COVID-19     Cough, chills, body aches x1 wk. Told today she was exposed to [address] [facility]   Patient is a 25-year-old female who presents the ED for concern for COVID-19 infection. Patient has full body aches and generalized weakness x1 week. 1 week ago she spent the holiday with her nephew, sister and brother all of which have COVID-23. No other issues at this time. No fevers, cough, shortness of breath, chest pain, abdominal pain, nausea, vomiting, changes in urine or stool. REVIEW OF SYSTEMS     Review of Systems   All other systems reviewed and are negative. PASTMEDICAL HISTORY     Past Medical History:   Diagnosis Date    Allergic rhinitis     Asthma     Chronic back pain     on 8/10/18 pt denies pain mgmnt    Fibromyalgia     Lumbar radiculopathy     Osteoarthritis      SURGICAL HISTORY       Past Surgical History:   Procedure Laterality Date     SECTION      DILATION AND CURETTAGE OF UTERUS      SAB     CURRENT MEDICATIONS       Previous Medications    ACETAMINOPHEN (APAP EXTRA STRENGTH) 500 MG TABLET    Take 2 tablets by mouth every 6 hours as needed for Pain    ALBUTEROL SULFATE HFA (VENTOLIN HFA) 108 (90 BASE) MCG/ACT INHALER    Inhale 2 puffs into the lungs 4 times daily as needed for Wheezing    IBUPROFEN (ADVIL;MOTRIN) 800 MG TABLET    Take 1 tablet by mouth every 8 hours as needed for Pain    LIDOCAINE HCL (ASPERCREME W/LIDOCAINE) 4 % CREA    Apply topically to affected areas 3 times daily as needed. PERMETHRIN (ELIMITE) 5 % CREAM    Apply topically as directed    TIZANIDINE (ZANAFLEX) 4 MG TABLET    Take 1 tablet by mouth every 8 hours as needed (for muscle soreness)     ALLERGIES     is allergic to morphine.   FAMILY HISTORY     She indicated that the status of her paternal grandmother is unknown. She indicated that the status of her paternal aunt is unknown. She indicated that the status of her neg hx is unknown. SOCIAL HISTORY       Social History     Tobacco Use    Smoking status: Current Every Day Smoker     Packs/day: 1.00     Years: 30.00     Pack years: 30.00     Types: Cigarettes    Smokeless tobacco: Never Used   Vaping Use    Vaping Use: Former   Substance Use Topics    Alcohol use: No    Drug use: Not Currently     Comment: Edgar  2 x week     PHYSICAL EXAM     INITIAL VITALS: /78   Pulse 79   Temp 98.7 °F (37.1 °C) (Oral)   Resp 18   Ht 5' 4\" (1.626 m)   Wt 175 lb (79.4 kg)   LMP 02/03/2020 (Approximate)   SpO2 98%   BMI 30.04 kg/m²    Physical Exam  HENT:      Head: Normocephalic. Right Ear: External ear normal.      Left Ear: External ear normal.      Nose: Nose normal.   Eyes:      Conjunctiva/sclera: Conjunctivae normal.   Cardiovascular:      Rate and Rhythm: Normal rate. Pulmonary:      Effort: Pulmonary effort is normal.   Abdominal:      General: Abdomen is flat. Skin:     General: Skin is dry. Neurological:      Mental Status: She is alert. Mental status is at baseline. Psychiatric:         Mood and Affect: Mood normal.         Behavior: Behavior normal.         MEDICAL DECISION MAKING:   The patient is hemodynamically stable, afebrile, nontoxic-appearing. Physical exam unremarkable. Based on history and exam moderate suspicion for COVID-19. ED plan for nonrapid Covid, work note, discharge. DIAGNOSTIC RESULTS   EKG:All EKG's are interpreted by the Emergency Department Physician who either signs or Co-signs this chart in the absence of a cardiologist.        RADIOLOGY:All plain film, CT, MRI, and formal ultrasound images (except ED bedside ultrasound) are read by the radiologist, see reports below, unless otherwisenoted in MDM or here.   No orders to display     LABS: All lab results were reviewed by myself, and all abnormals are listed below. Labs Reviewed   COVID-19, RAPID       EMERGENCY DEPARTMENTCOURSE:   No further work-up indicated at this time. Nursing notes reviewed. At this time this is what I find, the patient appears well and does not appear sick or toxic. I gave my usual and customary discussion of the risks and benefits of discharge versus admission. I answered the patient's questions. I gave the patient strict return precautions. Patient expressed understanding of the discharge instructions. Dictated but not reviewed. Vitals:    Vitals:    09/13/21 2301 09/13/21 2303   BP:  125/78   Pulse:  79   Resp:  18   Temp:  98.7 °F (37.1 °C)   TempSrc:  Oral   SpO2:  98%   Weight: 175 lb (79.4 kg)    Height: 5' 4\" (1.626 m)        The patient was given the following medications while in the emergency department:  No orders of the defined types were placed in this encounter. CONSULTS:  None    FINAL IMPRESSION      1. Viral URI          DISPOSITION/PLAN   DISPOSITION Decision To Discharge 09/14/2021 01:24:32 AM      PATIENT REFERRED TO:  No follow-up provider specified.   DISCHARGE MEDICATIONS:  New Prescriptions    No medications on file     Buddie Lombard, MD  Attending Emergency Physician                    Dalton Basurto MD  09/14/21 [id number]

## 2021-09-15 ENCOUNTER — CARE COORDINATION (OUTPATIENT)
Dept: CARE COORDINATION | Age: 51
End: 2021-09-15

## 2021-09-15 NOTE — CARE COORDINATION
First attempt to reach pt for covid risk education s/p er visit left vm to call [facility] [phone]
on any new and changed medications related to discharge diagnosis     Was patient discharged with a pulse oximeter? No Discussed and confirmed pulse oximeter discharge instructions and when to notify provider or seek emergency care. ACM provided contact information. Plan for follow-up call in 5-7 days based on severity of symptoms and risk factors. Spoke with pt who said she is feeling about the same. She will call her pcp to let her know she does have covid. She will consider getting the covid vaccine.

## 2021-09-15 NOTE — ACP (ADVANCE CARE PLANNING)
Advance Care Planning   Healthcare Decision Maker: ashlee strong       Click here to complete Healthcare Decision Makers including selection of the Healthcare Decision Maker Relationship (ie \"Primary\"). Today we documented Decision Maker(s) consistent with Legal Next of Kin hierarchy.

## 2021-09-22 ENCOUNTER — CARE COORDINATION (OUTPATIENT)
Dept: CARE COORDINATION | Age: 51
End: 2021-09-22

## 2021-09-22 NOTE — CARE COORDINATION
Attempted to reach pt for 7 day er / covid follow up left a vm for pt to call [facility] [phone]

## 2021-09-28 DIAGNOSIS — J40 BRONCHITIS: Primary | ICD-10-CM

## 2021-09-28 RX ORDER — AMOXICILLIN AND CLAVULANATE POTASSIUM 875; 125 MG/1; MG/1
1 TABLET, FILM COATED ORAL 2 TIMES DAILY
Qty: 14 TABLET | Refills: 0 | Status: SHIPPED | OUTPATIENT
Start: 2021-09-28 | End: 2021-10-05

## 2021-09-28 RX ORDER — AZITHROMYCIN 500 MG/1
500 TABLET, FILM COATED ORAL DAILY
Qty: 3 TABLET | Refills: 0 | Status: SHIPPED | OUTPATIENT
Start: 2021-09-28 | End: 2021-10-01

## 2021-09-29 ENCOUNTER — CARE COORDINATION (OUTPATIENT)
Dept: CARE COORDINATION | Age: 51
End: 2021-09-29

## 2021-09-29 NOTE — CARE COORDINATION
You Patient resolved from the Care Transitions episode on 9/29/21  Discussed COVID-19 related testing which was available at this time. Test results were positive. Patient informed of results, if available? Yes    Patient/family has been provided the following resources and education related to COVID-19:                         Signs, symptoms and red flags related to COVID-19            CDC exposure and quarantine guidelines            [facility] exposure contact - [phone]            Contact for their local Department of Health                 Patient currently reports that the following symptoms have improved:  still has a cough otherwise feeling better pcp did call her in abx      No further outreach scheduled with this CTN/ACM. Episode of Care resolved. Patient has this CTN/ACM contact information if future needs arise.

## 2021-10-01 DIAGNOSIS — M54.2 NECK PAIN: ICD-10-CM

## 2021-10-01 NOTE — TELEPHONE ENCOUNTER
zanaflex pending for refill     Health Maintenance   Topic Date Due    COVID-19 Vaccine (1) Never done    Colon cancer screen colonoscopy  Never done    Shingles Vaccine (1 of 2) Never done    Low dose CT lung screening  Never done    Flu vaccine (1) 09/01/2021    Breast cancer screen  07/02/2022    A1C test (Diabetic or Prediabetic)  09/04/2022    Cervical cancer screen  06/02/2025    Lipid screen  09/04/2026    DTaP/Tdap/Td vaccine (2 - Td or Tdap) 11/01/2028    Pneumococcal 0-64 years Vaccine (2 of 2 - PPSV23) 05/20/2033    Hepatitis C screen  Completed    HIV screen  Completed    Hepatitis A vaccine  Aged Out    Hepatitis B vaccine  Aged Out    Hib vaccine  Aged Out    Meningococcal (ACWY) vaccine  Aged Out             (applicable per patient's age: Cancer Screenings, Depression Screening, Fall Risk Screening, Immunizations)    Hemoglobin A1C (%)   Date Value   09/04/2021 6.3 (H)     LDL Cholesterol (mg/dL)   Date Value   09/04/2021 133 (H)     AST (U/L)   Date Value   11/01/2018 17     ALT (U/L)   Date Value   11/01/2018 11     BUN (mg/dL)   Date Value   09/03/2021 16      (goal A1C is < 7)   (goal LDL is <100) need 30-50% reduction from baseline     BP Readings from Last 3 Encounters:   09/13/21 125/78   09/09/21 123/81   09/04/21 (!) 146/67    (goal /80)      All Future Testing planned in [facility]:  Lab Frequency Next Occurrence   Saline lock IV Once 11/27/2020   MRI BREAST BILATERAL W WO CONTRAST Once 03/16/2022   Low Dose Chest CT -Abnormal Lung Screen Follow up Once 09/09/2022       Next Visit Date:  Future Appointments   Date Time Provider Franki Brody   11/30/2021 12:30 PM Beau Park MD [facility]   12/10/2021  9:30 AM Devan Quigley MD [facility]            Patient Active Problem List:     Chronic back pain     Neck pain     Asthma     Family history of breast cancer     Hypertrophy of lingual tonsil     Abnormal computed tomography scan     Smoker Lumbar radiculopathy     Post-concussion headache     Stroke-like symptoms     Left arm weakness     Post concussion syndrome

## 2021-10-04 RX ORDER — TIZANIDINE 4 MG/1
TABLET ORAL
Qty: 30 TABLET | Refills: 1 | Status: SHIPPED | OUTPATIENT
Start: 2021-10-04

## 2021-11-01 ENCOUNTER — OFFICE VISIT (OUTPATIENT)
Dept: NEUROLOGY | Age: 51
End: 2021-11-01
Payer: COMMERCIAL

## 2021-11-01 VITALS
SYSTOLIC BLOOD PRESSURE: 106 MMHG | HEART RATE: 59 BPM | BODY MASS INDEX: 30.55 KG/M2 | DIASTOLIC BLOOD PRESSURE: 72 MMHG | TEMPERATURE: 97.6 F | WEIGHT: 178 LBS | RESPIRATION RATE: 16 BRPM

## 2021-11-01 DIAGNOSIS — F07.81 POSTCONCUSSION SYNDROME: ICD-10-CM

## 2021-11-01 DIAGNOSIS — R41.3 MEMORY DIFFICULTIES: Primary | ICD-10-CM

## 2021-11-01 DIAGNOSIS — R51.9 NONINTRACTABLE HEADACHE, UNSPECIFIED CHRONICITY PATTERN, UNSPECIFIED HEADACHE TYPE: ICD-10-CM

## 2021-11-01 PROCEDURE — 3017F COLORECTAL CA SCREEN DOC REV: CPT | Performed by: FAMILY MEDICINE

## 2021-11-01 PROCEDURE — G8484 FLU IMMUNIZE NO ADMIN: HCPCS | Performed by: FAMILY MEDICINE

## 2021-11-01 PROCEDURE — 4004F PT TOBACCO SCREEN RCVD TLK: CPT | Performed by: FAMILY MEDICINE

## 2021-11-01 PROCEDURE — 1111F DSCHRG MED/CURRENT MED MERGE: CPT | Performed by: FAMILY MEDICINE

## 2021-11-01 PROCEDURE — 99214 OFFICE O/P EST MOD 30 MIN: CPT | Performed by: FAMILY MEDICINE

## 2021-11-01 PROCEDURE — G8427 DOCREV CUR MEDS BY ELIG CLIN: HCPCS | Performed by: FAMILY MEDICINE

## 2021-11-01 PROCEDURE — G8417 CALC BMI ABV UP PARAM F/U: HCPCS | Performed by: FAMILY MEDICINE

## 2021-11-01 RX ORDER — VERAPAMIL HYDROCHLORIDE 80 MG/1
80 TABLET ORAL NIGHTLY
Qty: 30 TABLET | Refills: 2 | Status: SHIPPED | OUTPATIENT
Start: 2021-11-01

## 2021-11-01 NOTE — PROGRESS NOTES
[address] [place] [address] # [address] [id number]  Dept: [phone]  Dept Fax: [phone]    NEUROLOGY NEW PATIENT NOTE       PATIENT NAME: Allan Bernard  PATIENT MRN: [id number]  PRIMARY CARE PHYSICIAN: Bernadette Meyer MD      HPI:      I have the pleasure to evaluate Allan Bernard who is a 46 y.o. Left-handed female patient with PMH of chronic back pain, asthma, recent covid-19 infection in September 2021 who came for post hospital evaluation of stroke-like symptoms. Per records, patient had a ceiling there was for water fall in her head. She is able to get up to a chair and sit down but does not recall what had happened that transpired the events. Unsure of LOC. CT head was obtained and CT C-spine which were both negative. CTA head and neck did not show any acute process but did reveal questionable focal stenosis versus tortuosity of the proximal left superior M2 branch. MRI brain was completed and unremarkable. 2D echo with EF 52% with negative bubble study  A1c 6.3      Patient had presented with numbness and left-sided weakness. Initial NIH of 1. Patient also continued to experience diffuse headaches. Reports almost daily headaches with using ibuprofen 800s as needed couple times a week. Has not tried any other medications for abortive or prophylaxis    Patient also reports having memory issues/forgetfulness.   MMSE done in the hospital was 26 out of 30    Meds: see med list    Smoking: former smoker 1 ppd for about 30 years, quit in July 2021  Alcohol: denies current use  Illicit Drugs: marijuana on occasion    Social: works in assisted services   Lives in a home with no issues with steps       PREVIOUS WORKUP:     Lab Results   Component Value Date    WBC 5.2 09/04/2021    HGB 13.4 09/04/2021    HCT 41.8 09/04/2021    MCV 94.6 09/04/2021     09/04/2021       Past Medical History:   Diagnosis Date    Allergic rhinitis  Asthma     Chronic back pain     on 8/10/18 pt denies pain mgmnt    Fibromyalgia     Lumbar radiculopathy     Osteoarthritis         Past Surgical History:   Procedure Laterality Date     SECTION      DILATION AND CURETTAGE OF UTERUS      SAB        Social History     Socioeconomic History    Marital status: Single     Spouse name: Not on file    Number of children: Not on file    Years of education: Not on file    Highest education level: Not on file   Occupational History    Not on file   Tobacco Use    Smoking status: Current Every Day Smoker     Packs/day: 1.00     Years: 30.00     Pack years: 30.00     Types: Cigarettes    Smokeless tobacco: Never Used   Vaping Use    Vaping Use: Former   Substance and Sexual Activity    Alcohol use: No    Drug use: Not Currently     Comment: Edgar  2 x week    Sexual activity: Yes     Partners: Male   Other Topics Concern    Not on file   Social History Narrative    Not on file     Social Determinants of Health     Financial Resource Strain:     Difficulty of Paying Living Expenses:    Food Insecurity:     Worried About Running Out of Food in the Last Year:     Ran Out of Food in the Last Year:    Transportation Needs:     Lack of Transportation (Medical):      Lack of Transportation (Non-Medical):    Physical Activity:     Days of Exercise per Week:     Minutes of Exercise per Session:    Stress:     Feeling of Stress :    Social Connections:     Frequency of Communication with Friends and Family:     Frequency of Social Gatherings with Friends and Family:     Attends Jew Services:     Active Member of Clubs or Organizations:     Attends Club or Organization Meetings:     Marital Status:    Intimate Partner Violence:     Fear of Current or Ex-Partner:     Emotionally Abused:     Physically Abused:     Sexually Abused:         Current Outpatient Medications   Medication Sig Dispense Refill    tiZANidine (Katie Adan) 4 MG tablet take 1 tablet by mouth every 8 hours if needed 30 tablet 1    albuterol sulfate HFA (VENTOLIN HFA) 108 (90 Base) MCG/ACT inhaler Inhale 2 puffs into the lungs 4 times daily as needed for Wheezing 54 g 1    ibuprofen (ADVIL;MOTRIN) 800 MG tablet Take 1 tablet by mouth every 8 hours as needed for Pain 30 tablet 0    acetaminophen (APAP EXTRA STRENGTH) 500 MG tablet Take 2 tablets by mouth every 6 hours as needed for Pain 30 tablet 0    Lidocaine HCl (ASPERCREME W/LIDOCAINE) 4 % CREA Apply topically to affected areas 3 times daily as needed. (Patient not taking: Reported on 9/9/2021) 49 g 0    permethrin (ELIMITE) 5 % cream Apply topically as directed (Patient not taking: Reported on 9/9/2021) 1 Tube 0     No current facility-administered medications for this visit. Allergies   Allergen Reactions    Morphine Anxiety and Other (See Comments)     Pt stated that the morphine gave her next pain and made her feel unwell         REVIEW OF SYSTEMS:     Constitutional  Negative for fever and chills    HEENT  Negative for ear discharge, ear pain, nosebleed    Eyes  Negative for photophobia, pain and discharge    Respiratory  Negative for hemoptysis and sputum    Cardiovascular  Negative for orthopnea, claudication and PND    Gastrointestinal  Negative for abdominal pain, diarrhea, blood in stool    Musculoskeletal  Negative for joint pain, negative for myalgia    Skin  Negative for rash or itching    Neurological Positive headache. Negative for seizures, weakness, dysarthria, aphasia   Endo/heme/allergies  Negative for polydipsia, environmental allergy    Psychiatric/behavioral  Negative for suicidal ideation. Patient is not anxious        VITALS  /72   Pulse 59   Temp 97.6 °F (36.4 °C)   Resp 16   Wt 178 lb (80.7 kg)   LMP 02/03/2020 (Approximate)   BMI 30.55 kg/m²      PHYSICAL EXAMINATION:     Physical Exam   General appearance: cooperative  Skin: no rash or skin lesions.   HEENT: normocephalic  Optic Fundi: deferred  Neck: supple, no cervcical adenopathy or carotid bruit  Lungs: clear to auscultation  Heart: Regular rate and rhythm, normal S1, S2. No murmurs, clicks or gallops.   Peripheral pulses: radial pulses palpable  Abdominal: BS present, soft, NT, ND  Extremities: no edema    NEUROLOGICAL EXAMINATION:     GENERAL  Appears comfortable and in no distress   HEENT  NC/ AT   HEART  S1 and S2 heard; palpation of pulses: radial pulse    NECK  Supple and no bruits heard   MENTAL STATUS:  Alert, oriented, memory recall 2/3, no confusion, normal speech, normal language, no hallucination or delusion   CRANIAL NERVES: II     -      Visual fields intact to confrontation  III,IV,VI -  PERR, EOMs full, no ptosis  V     -     Normal facial sensation   VII    -     Normal facial symmetry  VIII   -     Intact hearing   IX,X -     Symmetrical palate  XI    -     Symmetrical shoulder shrug  XII   -     Midline tongue, no atrophy    MOTOR FUNCTION: RUE: Significant for good strength of grade 5/5 in proximal and distal muscle groups   LUE: Significant for good strength of grade 5/5 in proximal and distal muscle groups   RLE: Significant for good strength of grade 5/5 in proximal and distal muscle groups   LLE: Significant for good strength of grade 5/5 in proximal and distal muscle groups      Normal bulk, normal tone and no involuntary movements, no tremor   SENSORY FUNCTION:  Normal touch, normal pin, normal vibration, normal proprioception   CEREBELLAR FUNCTION:  Intact fine motor control over upper limbs and lower limbs   REFLEX FUNCTION:  Symmetric in upper and lower extremities, no Babinski sign   STATION and GAIT  Normal gait and tandem station, normal tip toes and heel walking     IMAGING:     CT Head WO Contrast 9/3/2021  Impression   No acute intracranial process identified.       The findings were sent to the Radiology Results [address] at 2:22   pm on 9/3/2021 to be communicated to the Stroke Neurology Service. CT Cervical Spine WO Contrast 9/3/2021  Impression   1. No acute findings in the cervical spine. 2. Minimal to mild cervical spine degenerative changes. CTA Head Neck W Contrast 9/3/2021  Impression   1. Questionable focal stenosis versus tortuosity of the proximal left   superior M2 branch. 2. Otherwise, no evidence of large vessel occlusion, significant stenosis,   dissection, or aneurysm. MRI Brain WO Contrast 9/4/2021  Impression   1. Unremarkable MRI of the brain.  No evidence of acute infarct. 2. Scattered paranasal sinus disease with a large right maxillary mucus   retention cyst, unchanged. ASSESSMENT:      Case of a 51F in hospital follow up for post concussion syndrome    //headache  //memory difficulties    PLAN:     1. Start as verapamil 80mg for prophylaxis. Headache diary  2. Neurocognitive testing    Ms. Huan Bull received counseling on the following healthy behaviors: medical compliance, smoking cessation, blood pressure control, regular follow up with primary doctor. Return in about 2 months (around 1/1/2022) for headache.     Electronically signed by Katy Izquierdo MD on 11/1/2021 at 3:31 PM         Katy Izquierdo MD  Neurology PGY-3 Resident  11/1/21

## 2022-01-24 ENCOUNTER — TELEPHONE (OUTPATIENT)
Dept: NEUROSURGERY | Age: 52
End: 2022-01-24

## 2022-01-24 NOTE — LETTER
[address] [facility], [address] [place] [address], [address] [facility]  Phone: [phone]  Fax: [phone]       1/24/2022    [address] [place] [id number]    Dear Michael Templeton,    You recently missed a scheduled appointment with Dr. Em James  on 1/24/2022. Your health and follow-up medical care are important to us. Please call our office as soon as possible so that we may reschedule your appointment. If you have already rescheduled your appointment, please disregard this letter. This is the first scheduled appointment that you have missed within the last twelve months. If you miss 2 more appointments, you may be dismissed from the practice. For future appointments that you are unable to keep, please call the office at [phone] at least 24 hours in advance to cancel and reschedule.      Sincerely,      LEW Chambers

## 2023-02-24 ENCOUNTER — TELEPHONE (OUTPATIENT)
Dept: OBGYN | Age: 53
End: 2023-02-24

## 2023-02-24 NOTE — TELEPHONE ENCOUNTER
Letter was mailed to Pt. Please call the office to carl  your Annual and Mammogram appt.     Thank you.

## 2023-04-14 ENCOUNTER — APPOINTMENT (OUTPATIENT)
Dept: GENERAL RADIOLOGY | Age: 53
End: 2023-04-14
Payer: COMMERCIAL

## 2023-04-14 ENCOUNTER — HOSPITAL ENCOUNTER (EMERGENCY)
Age: 53
Discharge: HOME | End: 2023-04-14
Attending: STUDENT IN AN ORGANIZED HEALTH CARE EDUCATION/TRAINING PROGRAM
Payer: COMMERCIAL

## 2023-04-14 VITALS
BODY MASS INDEX: 34.55 KG/M2 | HEIGHT: 63 IN | OXYGEN SATURATION: 99 % | RESPIRATION RATE: 18 BRPM | HEART RATE: 64 BPM | WEIGHT: 195 LBS | SYSTOLIC BLOOD PRESSURE: 125 MMHG | DIASTOLIC BLOOD PRESSURE: 71 MMHG | TEMPERATURE: 97.7 F

## 2023-04-14 DIAGNOSIS — S93.402A SPRAIN OF LEFT ANKLE, UNSPECIFIED LIGAMENT, INITIAL ENCOUNTER: Primary | ICD-10-CM

## 2023-04-14 PROCEDURE — 6370000000 HC RX 637 (ALT 250 FOR IP): Performed by: STUDENT IN AN ORGANIZED HEALTH CARE EDUCATION/TRAINING PROGRAM

## 2023-04-14 PROCEDURE — 73610 X-RAY EXAM OF ANKLE: CPT

## 2023-04-14 PROCEDURE — 99283 EMERGENCY DEPT VISIT LOW MDM: CPT

## 2023-04-14 PROCEDURE — 73630 X-RAY EXAM OF FOOT: CPT

## 2023-04-14 RX ORDER — HYDROCODONE BITARTRATE AND ACETAMINOPHEN 5; 325 MG/1; MG/1
1 TABLET ORAL ONCE
Status: COMPLETED | OUTPATIENT
Start: 2023-04-14 | End: 2023-04-14

## 2023-04-14 RX ORDER — HYDROCODONE BITARTRATE AND ACETAMINOPHEN 5; 325 MG/1; MG/1
1 TABLET ORAL EVERY 4 HOURS PRN
Qty: 12 TABLET | Refills: 0 | Status: SHIPPED | OUTPATIENT
Start: 2023-04-14 | End: 2023-04-17

## 2023-04-14 RX ORDER — IBUPROFEN 600 MG/1
600 TABLET ORAL EVERY 6 HOURS PRN
Qty: 30 TABLET | Refills: 0 | Status: SHIPPED | OUTPATIENT
Start: 2023-04-14

## 2023-04-14 RX ADMIN — HYDROCODONE BITARTRATE AND ACETAMINOPHEN 1 TABLET: 5; 325 TABLET ORAL at 04:30

## 2023-04-14 ASSESSMENT — PAIN DESCRIPTION - FREQUENCY: FREQUENCY: CONTINUOUS

## 2023-04-14 ASSESSMENT — PAIN SCALES - GENERAL
PAINLEVEL_OUTOF10: 7
PAINLEVEL_OUTOF10: 7

## 2023-04-14 ASSESSMENT — PAIN DESCRIPTION - LOCATION: LOCATION: ANKLE;FOOT

## 2023-04-14 ASSESSMENT — PAIN DESCRIPTION - DESCRIPTORS: DESCRIPTORS: THROBBING;STABBING

## 2023-04-14 ASSESSMENT — PAIN DESCRIPTION - ORIENTATION: ORIENTATION: LEFT

## 2023-04-14 ASSESSMENT — PAIN - FUNCTIONAL ASSESSMENT: PAIN_FUNCTIONAL_ASSESSMENT: 0-10

## 2023-04-14 NOTE — ED TRIAGE NOTES
Pt comes to the ED as a walk in w/ c/o left foot and ankle pain following a rolled ankle while leaving for work about 1 hour pta. Pt reports she did not feel a pop but she immediately had pain across the top of her foot and a pulling sensation going up her ankle. Pt has some swelling and bruising. Pt iced it immediately and is still utilizing ice for pain.

## 2023-04-14 NOTE — DISCHARGE INSTRUCTIONS
Call today or tomorrow to follow up with Pepe Francois MD (Inactive)  in 7 days. Use an ice pack or bag filled with ice and apply to the injured area 3 - 4 times a day for 15 - 20 minutes each time. Use ibuprofen or Tylenol (unless prescribed medications that have Tylenol in it) for pain. You can take over the counter Ibuprofen (advil) tablets (4 every 8 hours or 3 every 6 hours or 2 every 4 hours)    Use your crutches for the next several days until you are able to take 10 steps without pain. Return to the Emergency Department for worsening of pain, increase swelling to the ankle, inability to move your toes, any other care or concern.

## 2024-09-11 ENCOUNTER — TELEPHONE (OUTPATIENT)
Dept: FAMILY MEDICINE CLINIC | Age: 54
End: 2024-09-11

## 2025-01-02 ENCOUNTER — OFFICE VISIT (OUTPATIENT)
Dept: FAMILY MEDICINE CLINIC | Age: 55
End: 2025-01-02
Payer: COMMERCIAL

## 2025-01-02 VITALS
WEIGHT: 196.6 LBS | HEART RATE: 78 BPM | HEIGHT: 66 IN | TEMPERATURE: 97.2 F | OXYGEN SATURATION: 98 % | DIASTOLIC BLOOD PRESSURE: 64 MMHG | SYSTOLIC BLOOD PRESSURE: 116 MMHG | BODY MASS INDEX: 31.6 KG/M2

## 2025-01-02 DIAGNOSIS — Z13.220 SCREENING FOR LIPID DISORDERS: ICD-10-CM

## 2025-01-02 DIAGNOSIS — M54.2 NECK PAIN: ICD-10-CM

## 2025-01-02 DIAGNOSIS — Z13.29 SCREENING FOR THYROID DISORDER: ICD-10-CM

## 2025-01-02 DIAGNOSIS — Z87.891 FORMER TOBACCO USE: Primary | ICD-10-CM

## 2025-01-02 DIAGNOSIS — Z13.0 SCREENING FOR DEFICIENCY ANEMIA: ICD-10-CM

## 2025-01-02 DIAGNOSIS — Z13.1 SCREENING FOR DIABETES MELLITUS: ICD-10-CM

## 2025-01-02 DIAGNOSIS — Z12.31 ENCOUNTER FOR SCREENING MAMMOGRAM FOR BREAST CANCER: ICD-10-CM

## 2025-01-02 DIAGNOSIS — Z13.21 ENCOUNTER FOR VITAMIN DEFICIENCY SCREENING: ICD-10-CM

## 2025-01-02 PROCEDURE — G8427 DOCREV CUR MEDS BY ELIG CLIN: HCPCS | Performed by: STUDENT IN AN ORGANIZED HEALTH CARE EDUCATION/TRAINING PROGRAM

## 2025-01-02 PROCEDURE — 99214 OFFICE O/P EST MOD 30 MIN: CPT | Performed by: STUDENT IN AN ORGANIZED HEALTH CARE EDUCATION/TRAINING PROGRAM

## 2025-01-02 PROCEDURE — 1036F TOBACCO NON-USER: CPT | Performed by: STUDENT IN AN ORGANIZED HEALTH CARE EDUCATION/TRAINING PROGRAM

## 2025-01-02 PROCEDURE — G8417 CALC BMI ABV UP PARAM F/U: HCPCS | Performed by: STUDENT IN AN ORGANIZED HEALTH CARE EDUCATION/TRAINING PROGRAM

## 2025-01-02 PROCEDURE — 3017F COLORECTAL CA SCREEN DOC REV: CPT | Performed by: STUDENT IN AN ORGANIZED HEALTH CARE EDUCATION/TRAINING PROGRAM

## 2025-01-02 RX ORDER — LIDOCAINE 4 G/G
1 PATCH TOPICAL DAILY
Qty: 30 PATCH | Refills: 0 | Status: SHIPPED | OUTPATIENT
Start: 2025-01-02 | End: 2025-02-01

## 2025-01-02 RX ORDER — CYCLOBENZAPRINE HCL 10 MG
10 TABLET ORAL NIGHTLY PRN
Qty: 10 TABLET | Refills: 0 | Status: SHIPPED | OUTPATIENT
Start: 2025-01-02 | End: 2025-02-01

## 2025-01-02 RX ORDER — LIDOCAINE HCL 4% 4 G/100G
CREAM TOPICAL
Qty: 49 G | Refills: 0 | Status: SHIPPED | OUTPATIENT
Start: 2025-01-02

## 2025-01-02 SDOH — ECONOMIC STABILITY: FOOD INSECURITY: WITHIN THE PAST 12 MONTHS, YOU WORRIED THAT YOUR FOOD WOULD RUN OUT BEFORE YOU GOT MONEY TO BUY MORE.: NEVER TRUE

## 2025-01-02 SDOH — ECONOMIC STABILITY: INCOME INSECURITY: HOW HARD IS IT FOR YOU TO PAY FOR THE VERY BASICS LIKE FOOD, HOUSING, MEDICAL CARE, AND HEATING?: NOT HARD AT ALL

## 2025-01-02 SDOH — ECONOMIC STABILITY: FOOD INSECURITY: WITHIN THE PAST 12 MONTHS, THE FOOD YOU BOUGHT JUST DIDN'T LAST AND YOU DIDN'T HAVE MONEY TO GET MORE.: NEVER TRUE

## 2025-01-02 ASSESSMENT — PATIENT HEALTH QUESTIONNAIRE - PHQ9
SUM OF ALL RESPONSES TO PHQ QUESTIONS 1-9: 0
SUM OF ALL RESPONSES TO PHQ QUESTIONS 1-9: 0
1. LITTLE INTEREST OR PLEASURE IN DOING THINGS: NOT AT ALL
SUM OF ALL RESPONSES TO PHQ QUESTIONS 1-9: 0
SUM OF ALL RESPONSES TO PHQ QUESTIONS 1-9: 0
2. FEELING DOWN, DEPRESSED OR HOPELESS: NOT AT ALL
SUM OF ALL RESPONSES TO PHQ9 QUESTIONS 1 & 2: 0

## 2025-01-03 NOTE — PROGRESS NOTES
[facility]  [facility]  [address]  [place] [address]     Date of Visit:  2025  Patient Name: Kusum Carroll   Patient :  1970     CHIEF COMPLAINT:     Kusum Carroll is a 54 y.o. female who presents today for an general visit to be evaluated for the following condition(s):  Chief Complaint   Patient presents with    New Patient    Foot Pain     planter warts     Dysuria       REVIEW OF SYSTEM      Review of Systems    HISTORY OF PRESENT ILLNESS     History of Present Illness  The patient presents for a new patient physical exam.    She has a history of asthma, fibromyalgia, chronic back pain, allergies, and arthritis. She reports no history of eczema. She has not been consistent with her breast examinations due to personal circumstances but expresses a desire to resume regular check-ups. She has a spot on her foot, which does not bother her, but her daughter has the same spot. They think it is plantar warts, so they were researching it, and it says that they are linked with HPV, which made her nervous. She has never had a CT scan for her lungs. She quit smoking about 2 years ago after smoking for over 20 years. She started smoking in  and used to smoke about a pack a day. She is currently vaping.    Her primary concern today is pain in her neck and lower back. She has a history of a pinched nerve, which occasionally causes discomfort during movements such as standing up or sitting down. She was diagnosed with severe tendinitis 12 years ago, which continues to cause her significant discomfort. She has found relief from Flexeril, which she takes intermittently due to its sedative effects. She prefers the 10 mg dose over the 5 mg dose, which she finds less effective. She typically reserves the medication for her days off unless the pain is particularly severe. She has also tried Zanaflex but found it less effective than Flexeril. She has been using lidocaine cream

## 2025-01-06 ENCOUNTER — HOSPITAL ENCOUNTER (OUTPATIENT)
Age: 55
Setting detail: SPECIMEN
Discharge: HOME | End: 2025-01-06

## 2025-01-06 DIAGNOSIS — Z13.0 SCREENING FOR DEFICIENCY ANEMIA: ICD-10-CM

## 2025-01-06 DIAGNOSIS — Z13.29 SCREENING FOR THYROID DISORDER: ICD-10-CM

## 2025-01-06 DIAGNOSIS — Z13.1 SCREENING FOR DIABETES MELLITUS: ICD-10-CM

## 2025-01-06 DIAGNOSIS — Z13.220 SCREENING FOR LIPID DISORDERS: ICD-10-CM

## 2025-01-06 DIAGNOSIS — Z13.21 ENCOUNTER FOR VITAMIN DEFICIENCY SCREENING: ICD-10-CM

## 2025-01-06 LAB
25(OH)D3 SERPL-MCNC: 34.4 NG/ML (ref 30–100)
ALBUMIN SERPL-MCNC: 4.7 G/DL (ref 3.5–5.2)
ALBUMIN/GLOB SERPL: 1.9 {RATIO} (ref 1–2.5)
ALP SERPL-CCNC: 71 U/L (ref 35–104)
ALT SERPL-CCNC: 23 U/L (ref 10–35)
ANION GAP SERPL CALCULATED.3IONS-SCNC: 11 MMOL/L (ref 9–16)
AST SERPL-CCNC: 24 U/L (ref 10–35)
BASOPHILS # BLD: 0.07 K/UL (ref 0–0.2)
BASOPHILS NFR BLD: 1 % (ref 0–2)
BILIRUB SERPL-MCNC: 0.4 MG/DL (ref 0–1.2)
BUN SERPL-MCNC: 15 MG/DL (ref 6–20)
CALCIUM SERPL-MCNC: 9.8 MG/DL (ref 8.6–10.4)
CHLORIDE SERPL-SCNC: 102 MMOL/L (ref 98–107)
CHOLEST SERPL-MCNC: 249 MG/DL (ref 0–199)
CHOLESTEROL/HDL RATIO: 3.6
CO2 SERPL-SCNC: 28 MMOL/L (ref 20–31)
CREAT SERPL-MCNC: 0.9 MG/DL (ref 0.6–0.9)
EOSINOPHIL # BLD: 0.22 K/UL (ref 0–0.44)
EOSINOPHILS RELATIVE PERCENT: 4 % (ref 1–4)
ERYTHROCYTE [DISTWIDTH] IN BLOOD BY AUTOMATED COUNT: 12.8 % (ref 11.8–14.4)
EST. AVERAGE GLUCOSE BLD GHB EST-MCNC: 123 MG/DL
FOLATE SERPL-MCNC: 10.7 NG/ML (ref 4.8–24.2)
GFR, ESTIMATED: 75 ML/MIN/1.73M2
GLUCOSE SERPL-MCNC: 89 MG/DL (ref 74–99)
HBA1C MFR BLD: 5.9 % (ref 4–6)
HCT VFR BLD AUTO: 43 % (ref 36.3–47.1)
HDLC SERPL-MCNC: 70 MG/DL
HGB BLD-MCNC: 13.8 G/DL (ref 11.9–15.1)
IMM GRANULOCYTES # BLD AUTO: <0.03 K/UL (ref 0–0.3)
IMM GRANULOCYTES NFR BLD: 0 %
LDLC SERPL CALC-MCNC: 164 MG/DL (ref 0–100)
LYMPHOCYTES NFR BLD: 2.27 K/UL (ref 1.1–3.7)
LYMPHOCYTES RELATIVE PERCENT: 41 % (ref 24–43)
MCH RBC QN AUTO: 29.1 PG (ref 25.2–33.5)
MCHC RBC AUTO-ENTMCNC: 32.1 G/DL (ref 28.4–34.8)
MCV RBC AUTO: 90.7 FL (ref 82.6–102.9)
MONOCYTES NFR BLD: 0.43 K/UL (ref 0.1–1.2)
MONOCYTES NFR BLD: 8 % (ref 3–12)
NEUTROPHILS NFR BLD: 46 % (ref 36–65)
NEUTS SEG NFR BLD: 2.53 K/UL (ref 1.5–8.1)
NRBC BLD-RTO: 0 PER 100 WBC
PLATELET # BLD AUTO: 293 K/UL (ref 138–453)
PMV BLD AUTO: 8.9 FL (ref 8.1–13.5)
POTASSIUM SERPL-SCNC: 4.5 MMOL/L (ref 3.7–5.3)
PROT SERPL-MCNC: 7.2 G/DL (ref 6.6–8.7)
RBC # BLD AUTO: 4.74 M/UL (ref 3.95–5.11)
SODIUM SERPL-SCNC: 141 MMOL/L (ref 136–145)
TRIGL SERPL-MCNC: 73 MG/DL
TSH SERPL DL<=0.05 MIU/L-ACNC: 1.82 UIU/ML (ref 0.27–4.2)
VIT B12 SERPL-MCNC: 675 PG/ML (ref 232–1245)
VLDLC SERPL CALC-MCNC: 15 MG/DL (ref 1–30)
WBC OTHER # BLD: 5.5 K/UL (ref 3.5–11.3)

## 2025-01-13 ENCOUNTER — TELEPHONE (OUTPATIENT)
Dept: FAMILY MEDICINE CLINIC | Age: 55
End: 2025-01-13

## 2025-01-13 NOTE — TELEPHONE ENCOUNTER
Patient would like to know her results to her blood work. I told patient it has not been review yet and to give pcp some time.

## 2025-03-18 ENCOUNTER — OFFICE VISIT (OUTPATIENT)
Dept: FAMILY MEDICINE CLINIC | Age: 55
End: 2025-03-18
Payer: COMMERCIAL

## 2025-03-18 VITALS
OXYGEN SATURATION: 98 % | SYSTOLIC BLOOD PRESSURE: 118 MMHG | BODY MASS INDEX: 33.85 KG/M2 | TEMPERATURE: 97.7 F | HEIGHT: 65 IN | DIASTOLIC BLOOD PRESSURE: 68 MMHG | WEIGHT: 203.2 LBS | HEART RATE: 70 BPM

## 2025-03-18 DIAGNOSIS — Z12.11 SCREENING FOR COLON CANCER: ICD-10-CM

## 2025-03-18 DIAGNOSIS — M54.2 NECK PAIN: Primary | ICD-10-CM

## 2025-03-18 DIAGNOSIS — Z00.00 PHYSICAL EXAM: ICD-10-CM

## 2025-03-18 DIAGNOSIS — B07.0 PLANTAR WART OF LEFT FOOT: ICD-10-CM

## 2025-03-18 DIAGNOSIS — J30.89 ALLERGIC RHINITIS DUE TO OTHER ALLERGIC TRIGGER, UNSPECIFIED SEASONALITY: ICD-10-CM

## 2025-03-18 DIAGNOSIS — M54.50 CHRONIC LOW BACK PAIN, UNSPECIFIED BACK PAIN LATERALITY, UNSPECIFIED WHETHER SCIATICA PRESENT: ICD-10-CM

## 2025-03-18 DIAGNOSIS — G89.29 CHRONIC LOW BACK PAIN, UNSPECIFIED BACK PAIN LATERALITY, UNSPECIFIED WHETHER SCIATICA PRESENT: ICD-10-CM

## 2025-03-18 DIAGNOSIS — B07.0 PLANTAR WART OF RIGHT FOOT: ICD-10-CM

## 2025-03-18 PROCEDURE — 99396 PREV VISIT EST AGE 40-64: CPT | Performed by: STUDENT IN AN ORGANIZED HEALTH CARE EDUCATION/TRAINING PROGRAM

## 2025-03-18 PROCEDURE — 17110 DESTRUCTION B9 LES UP TO 14: CPT | Performed by: STUDENT IN AN ORGANIZED HEALTH CARE EDUCATION/TRAINING PROGRAM

## 2025-03-18 RX ORDER — FLUTICASONE PROPIONATE 50 MCG
1 SPRAY, SUSPENSION (ML) NASAL DAILY
Qty: 16 G | Refills: 0 | Status: SHIPPED | OUTPATIENT
Start: 2025-03-18

## 2025-03-18 RX ORDER — CYCLOBENZAPRINE HCL 10 MG
10 TABLET ORAL NIGHTLY PRN
Qty: 30 TABLET | Refills: 2 | Status: SHIPPED | OUTPATIENT
Start: 2025-03-18 | End: 2025-06-16

## 2025-03-18 SDOH — ECONOMIC STABILITY: FOOD INSECURITY: WITHIN THE PAST 12 MONTHS, THE FOOD YOU BOUGHT JUST DIDN'T LAST AND YOU DIDN'T HAVE MONEY TO GET MORE.: NEVER TRUE

## 2025-03-18 SDOH — ECONOMIC STABILITY: FOOD INSECURITY: WITHIN THE PAST 12 MONTHS, YOU WORRIED THAT YOUR FOOD WOULD RUN OUT BEFORE YOU GOT MONEY TO BUY MORE.: NEVER TRUE

## 2025-03-18 ASSESSMENT — PATIENT HEALTH QUESTIONNAIRE - PHQ9
5. POOR APPETITE OR OVEREATING: NOT AT ALL
9. THOUGHTS THAT YOU WOULD BE BETTER OFF DEAD, OR OF HURTING YOURSELF: NOT AT ALL
10. IF YOU CHECKED OFF ANY PROBLEMS, HOW DIFFICULT HAVE THESE PROBLEMS MADE IT FOR YOU TO DO YOUR WORK, TAKE CARE OF THINGS AT HOME, OR GET ALONG WITH OTHER PEOPLE: NOT DIFFICULT AT ALL
3. TROUBLE FALLING OR STAYING ASLEEP: NOT AT ALL
2. FEELING DOWN, DEPRESSED OR HOPELESS: MORE THAN HALF THE DAYS
6. FEELING BAD ABOUT YOURSELF - OR THAT YOU ARE A FAILURE OR HAVE LET YOURSELF OR YOUR FAMILY DOWN: NOT AT ALL
SUM OF ALL RESPONSES TO PHQ QUESTIONS 1-9: 7
4. FEELING TIRED OR HAVING LITTLE ENERGY: NEARLY EVERY DAY
7. TROUBLE CONCENTRATING ON THINGS, SUCH AS READING THE NEWSPAPER OR WATCHING TELEVISION: NOT AT ALL
SUM OF ALL RESPONSES TO PHQ QUESTIONS 1-9: 7
8. MOVING OR SPEAKING SO SLOWLY THAT OTHER PEOPLE COULD HAVE NOTICED. OR THE OPPOSITE, BEING SO FIGETY OR RESTLESS THAT YOU HAVE BEEN MOVING AROUND A LOT MORE THAN USUAL: NOT AT ALL
SUM OF ALL RESPONSES TO PHQ QUESTIONS 1-9: 7
SUM OF ALL RESPONSES TO PHQ QUESTIONS 1-9: 7
1. LITTLE INTEREST OR PLEASURE IN DOING THINGS: MORE THAN HALF THE DAYS

## 2025-03-18 ASSESSMENT — ANXIETY QUESTIONNAIRES
6. BECOMING EASILY ANNOYED OR IRRITABLE: NOT AT ALL
IF YOU CHECKED OFF ANY PROBLEMS ON THIS QUESTIONNAIRE, HOW DIFFICULT HAVE THESE PROBLEMS MADE IT FOR YOU TO DO YOUR WORK, TAKE CARE OF THINGS AT HOME, OR GET ALONG WITH OTHER PEOPLE: NOT DIFFICULT AT ALL
2. NOT BEING ABLE TO STOP OR CONTROL WORRYING: MORE THAN HALF THE DAYS
5. BEING SO RESTLESS THAT IT IS HARD TO SIT STILL: NOT AT ALL
3. WORRYING TOO MUCH ABOUT DIFFERENT THINGS: MORE THAN HALF THE DAYS
GAD7 TOTAL SCORE: 6
7. FEELING AFRAID AS IF SOMETHING AWFUL MIGHT HAPPEN: NOT AT ALL
1. FEELING NERVOUS, ANXIOUS, OR ON EDGE: NOT AT ALL
4. TROUBLE RELAXING: MORE THAN HALF THE DAYS

## 2025-03-18 NOTE — PROGRESS NOTES
[facility]  [facility]  [address]  [place] [address]     Date of Visit:  3/18/2025  Patient Name: Kusum Carroll   Patient :  1970     CHIEF COMPLAINT:     Kusum Carroll is a 54 y.o. female who presents today for an general visit to be evaluated for the following condition(s):  Chief Complaint   Patient presents with    Annual Exam     Refill For Flexeril        REVIEW OF SYSTEM      Review of Systems    HISTORY OF PRESENT ILLNESS     History of Present Illness  The patient presents for a physical exam.    She has been actively managing her diet, including a reduction in sodium intake by eliminating turkey noe and turkey sticks, although she occasionally consumes turkey noe. She has abstained from smoking for several years and ceased vaping a few months prior. Her exercise regimen includes gym workouts 3 days a week, walking on the treadmill, light weightlifting, and stretching exercises, totaling approximately 3 hours per week. She works night shifts and sleeps during the day, typically waking up around 2:00 or 3:00 PM. She reports satisfactory sleep quality. She has reduced her soda consumption, now drinking 1 to 2 cans of Diet Pepsi daily, and has increased her water intake. She also consumes up to 2 cups of coffee upon waking. She has expressed fear about undergoing a colonoscopy due to concerns about bowel movements. She experiences mild rhinorrhea in the winter, which she manages with Neosporin application. She occasionally feels as though she has phlegm in her nose. She has been informed by her children that she snores, but she does not experience any episodes of gasping for breath at night. She has no issues with her toes. She has a mammogram scheduled for next week and is due for a Pap smear in 2025.    She has plantar warts on her left foot, which occasionally cause discomfort.    She has been prescribed Flexeril 10 mg for back pain, which she finds

## 2025-03-27 ENCOUNTER — TELEPHONE (OUTPATIENT)
Dept: GASTROENTEROLOGY | Age: 55
End: 2025-03-27

## 2025-03-29 ENCOUNTER — HOSPITAL ENCOUNTER (OUTPATIENT)
Dept: MAMMOGRAPHY | Age: 55
Discharge: HOME | End: 2025-03-31
Attending: STUDENT IN AN ORGANIZED HEALTH CARE EDUCATION/TRAINING PROGRAM
Payer: COMMERCIAL

## 2025-03-29 DIAGNOSIS — Z12.31 ENCOUNTER FOR SCREENING MAMMOGRAM FOR BREAST CANCER: ICD-10-CM

## 2025-03-29 PROCEDURE — 77063 BREAST TOMOSYNTHESIS BI: CPT

## 2025-04-01 ENCOUNTER — RESULTS FOLLOW-UP (OUTPATIENT)
Dept: FAMILY MEDICINE CLINIC | Age: 55
End: 2025-04-01

## 2025-04-01 ENCOUNTER — PREP FOR PROCEDURE (OUTPATIENT)
Dept: GASTROENTEROLOGY | Age: 55
End: 2025-04-01

## 2025-04-01 DIAGNOSIS — Z12.11 SCREEN FOR COLON CANCER: ICD-10-CM

## 2025-04-25 ENCOUNTER — ANESTHESIA (OUTPATIENT)
Dept: OPERATING ROOM | Age: 55
End: 2025-04-25
Payer: COMMERCIAL

## 2025-04-25 ENCOUNTER — HOSPITAL ENCOUNTER (OUTPATIENT)
Age: 55
Setting detail: OUTPATIENT SURGERY
Discharge: HOME | End: 2025-04-25
Attending: INTERNAL MEDICINE | Admitting: INTERNAL MEDICINE
Payer: COMMERCIAL

## 2025-04-25 ENCOUNTER — ANESTHESIA EVENT (OUTPATIENT)
Dept: OPERATING ROOM | Age: 55
End: 2025-04-25
Payer: COMMERCIAL

## 2025-04-25 VITALS
HEIGHT: 66 IN | BODY MASS INDEX: 32.19 KG/M2 | TEMPERATURE: 97.7 F | SYSTOLIC BLOOD PRESSURE: 131 MMHG | WEIGHT: 200.3 LBS | RESPIRATION RATE: 11 BRPM | OXYGEN SATURATION: 98 % | DIASTOLIC BLOOD PRESSURE: 87 MMHG | HEART RATE: 56 BPM

## 2025-04-25 DIAGNOSIS — Z12.11 SCREEN FOR COLON CANCER: ICD-10-CM

## 2025-04-25 PROCEDURE — 7100000011 HC PHASE II RECOVERY - ADDTL 15 MIN: Performed by: INTERNAL MEDICINE

## 2025-04-25 PROCEDURE — 2709999900 HC NON-CHARGEABLE SUPPLY: Performed by: INTERNAL MEDICINE

## 2025-04-25 PROCEDURE — 3700000000 HC ANESTHESIA ATTENDED CARE: Performed by: INTERNAL MEDICINE

## 2025-04-25 PROCEDURE — 45385 COLONOSCOPY W/LESION REMOVAL: CPT | Performed by: INTERNAL MEDICINE

## 2025-04-25 PROCEDURE — 3609010600 HC COLONOSCOPY POLYPECTOMY SNARE/COLD BIOPSY: Performed by: INTERNAL MEDICINE

## 2025-04-25 PROCEDURE — 7100000010 HC PHASE II RECOVERY - FIRST 15 MIN: Performed by: INTERNAL MEDICINE

## 2025-04-25 PROCEDURE — 88305 TISSUE EXAM BY PATHOLOGIST: CPT

## 2025-04-25 PROCEDURE — 6360000002 HC RX W HCPCS: Performed by: NURSE ANESTHETIST, CERTIFIED REGISTERED

## 2025-04-25 PROCEDURE — 3700000001 HC ADD 15 MINUTES (ANESTHESIA): Performed by: INTERNAL MEDICINE

## 2025-04-25 PROCEDURE — 2580000003 HC RX 258: Performed by: ANESTHESIOLOGY

## 2025-04-25 RX ORDER — OXYCODONE HYDROCHLORIDE 5 MG/1
5 TABLET ORAL
Status: DISCONTINUED | OUTPATIENT
Start: 2025-04-25 | End: 2025-04-25 | Stop reason: HOSPADM

## 2025-04-25 RX ORDER — SODIUM CHLORIDE 0.9 % (FLUSH) 0.9 %
5-40 SYRINGE (ML) INJECTION EVERY 12 HOURS SCHEDULED
Status: DISCONTINUED | OUTPATIENT
Start: 2025-04-25 | End: 2025-04-25 | Stop reason: HOSPADM

## 2025-04-25 RX ORDER — PROPOFOL 10 MG/ML
INJECTION, EMULSION INTRAVENOUS
Status: DISCONTINUED | OUTPATIENT
Start: 2025-04-25 | End: 2025-04-25 | Stop reason: SDUPTHER

## 2025-04-25 RX ORDER — POLYETHYLENE GLYCOL-3350 AND ELECTROLYTES 236; 6.74; 5.86; 2.97; 22.74 G/274.31G; G/274.31G; G/274.31G; G/274.31G; G/274.31G
4 POWDER, FOR SOLUTION ORAL ONCE
COMMUNITY
Start: 2025-04-01

## 2025-04-25 RX ORDER — SODIUM CHLORIDE, SODIUM LACTATE, POTASSIUM CHLORIDE, CALCIUM CHLORIDE 600; 310; 30; 20 MG/100ML; MG/100ML; MG/100ML; MG/100ML
INJECTION, SOLUTION INTRAVENOUS CONTINUOUS
Status: DISCONTINUED | OUTPATIENT
Start: 2025-04-25 | End: 2025-04-25 | Stop reason: HOSPADM

## 2025-04-25 RX ORDER — HYDROMORPHONE HYDROCHLORIDE 1 MG/ML
0.5 INJECTION, SOLUTION INTRAMUSCULAR; INTRAVENOUS; SUBCUTANEOUS EVERY 5 MIN PRN
Status: DISCONTINUED | OUTPATIENT
Start: 2025-04-25 | End: 2025-04-25 | Stop reason: HOSPADM

## 2025-04-25 RX ORDER — SODIUM CHLORIDE 9 MG/ML
INJECTION, SOLUTION INTRAVENOUS PRN
Status: DISCONTINUED | OUTPATIENT
Start: 2025-04-25 | End: 2025-04-25 | Stop reason: HOSPADM

## 2025-04-25 RX ORDER — NALOXONE HYDROCHLORIDE 0.4 MG/ML
INJECTION, SOLUTION INTRAMUSCULAR; INTRAVENOUS; SUBCUTANEOUS PRN
Status: DISCONTINUED | OUTPATIENT
Start: 2025-04-25 | End: 2025-04-25 | Stop reason: HOSPADM

## 2025-04-25 RX ORDER — SODIUM CHLORIDE 9 MG/ML
INJECTION, SOLUTION INTRAVENOUS CONTINUOUS
Status: DISCONTINUED | OUTPATIENT
Start: 2025-04-25 | End: 2025-04-25 | Stop reason: HOSPADM

## 2025-04-25 RX ORDER — LIDOCAINE HYDROCHLORIDE 20 MG/ML
INJECTION, SOLUTION EPIDURAL; INFILTRATION; INTRACAUDAL; PERINEURAL
Status: DISCONTINUED | OUTPATIENT
Start: 2025-04-25 | End: 2025-04-25 | Stop reason: SDUPTHER

## 2025-04-25 RX ORDER — FENTANYL CITRATE 50 UG/ML
25 INJECTION, SOLUTION INTRAMUSCULAR; INTRAVENOUS EVERY 5 MIN PRN
Status: DISCONTINUED | OUTPATIENT
Start: 2025-04-25 | End: 2025-04-25 | Stop reason: HOSPADM

## 2025-04-25 RX ORDER — SODIUM CHLORIDE 0.9 % (FLUSH) 0.9 %
5-40 SYRINGE (ML) INJECTION PRN
Status: DISCONTINUED | OUTPATIENT
Start: 2025-04-25 | End: 2025-04-25 | Stop reason: HOSPADM

## 2025-04-25 RX ORDER — METOCLOPRAMIDE HYDROCHLORIDE 5 MG/ML
10 INJECTION INTRAMUSCULAR; INTRAVENOUS
Status: DISCONTINUED | OUTPATIENT
Start: 2025-04-25 | End: 2025-04-25 | Stop reason: HOSPADM

## 2025-04-25 RX ORDER — LIDOCAINE HYDROCHLORIDE 10 MG/ML
1 INJECTION, SOLUTION EPIDURAL; INFILTRATION; INTRACAUDAL; PERINEURAL
Status: DISCONTINUED | OUTPATIENT
Start: 2025-04-26 | End: 2025-04-25 | Stop reason: HOSPADM

## 2025-04-25 RX ORDER — HALOPERIDOL 5 MG/ML
1 INJECTION INTRAMUSCULAR
Status: DISCONTINUED | OUTPATIENT
Start: 2025-04-25 | End: 2025-04-25 | Stop reason: HOSPADM

## 2025-04-25 RX ADMIN — PROPOFOL 50 MG: 10 INJECTION, EMULSION INTRAVENOUS at 09:41

## 2025-04-25 RX ADMIN — LIDOCAINE HYDROCHLORIDE 50 MG: 20 INJECTION, SOLUTION EPIDURAL; INFILTRATION; INTRACAUDAL; PERINEURAL at 09:40

## 2025-04-25 RX ADMIN — SODIUM CHLORIDE, POTASSIUM CHLORIDE, SODIUM LACTATE AND CALCIUM CHLORIDE: 600; 310; 30; 20 INJECTION, SOLUTION INTRAVENOUS at 09:31

## 2025-04-25 RX ADMIN — PROPOFOL 50 MG: 10 INJECTION, EMULSION INTRAVENOUS at 09:44

## 2025-04-25 RX ADMIN — PROPOFOL 150 MCG/KG/MIN: 10 INJECTION, EMULSION INTRAVENOUS at 09:40

## 2025-04-25 ASSESSMENT — PAIN - FUNCTIONAL ASSESSMENT: PAIN_FUNCTIONAL_ASSESSMENT: 0-10

## 2025-04-25 ASSESSMENT — ENCOUNTER SYMPTOMS
WHEEZING: 0
SHORTNESS OF BREATH: 0
EYES NEGATIVE: 1
SORE THROAT: 0
RHINORRHEA: 0
COUGH: 0
CHEST TIGHTNESS: 0

## 2025-04-25 ASSESSMENT — LIFESTYLE VARIABLES: SMOKING_STATUS: 1

## 2025-04-25 NOTE — H&P
History and Physical Service   [facility]    HISTORY AND PHYSICAL EXAMINATION            Date of Evaluation: 2025  Patient name:  Kusum Carroll  MRN:   [id number]  YOB: 1970  PCP:    Pauline Carballo MD    History Obtained From:     Patient, medical records    History of Present Illness:     This is Kusum Carroll a 54 y.o. female who presents today for a COLORECTAL CANCER SCREENING, NOT HIGH RISK by Bob Levy MD for Screen for colon cancer. Patient denies bowel changes. She reports she does have known hemorrhoids that may intermittently produce blood when wiping. Denies bloody tarry stools, diarrhea alternating with constipation, nausea, vomiting, abdominal pain or unintentional weight loss. Patient followed bowel prep until watery clear. Has not had previous colonoscopy. No FH colon cancer or polyps. Denies fever, chills, shortness of breath, cough, congestion, wheezing, chest pain, open sores or wounds. Denies hx of diabetes. Denies any current blood thinning medications.     Past Medical History:     Past Medical History:   Diagnosis Date    Allergic rhinitis     Asthma     Chronic back pain     on 8/10/18 pt denies pain mgmnt    Fibromyalgia     Hyperlipidemia     Lumbar radiculopathy     Osteoarthritis         Past Surgical History:     Past Surgical History:   Procedure Laterality Date     SECTION      DILATION AND CURETTAGE OF UTERUS      SAB        Medications Prior to Admission:     Prior to Admission medications    Medication Sig Start Date End Date Taking? Authorizing Provider   GAVILYTE-G 236 g solution Take 4,000 mLs by mouth once 25  Yes ProviderRowena MD   bisacodyl 5 MG EC tablet Take as directed by physician office for colonoscopy prep 25  Yes Bob Levy MD   cyclobenzaprine (FLEXERIL) 10 MG tablet Take 1 tablet by mouth nightly as needed for Muscle spasms 3/18/25 6/16/25 Yes Pauline Carballo MD   fluticasone       General: No swelling or tenderness. Normal range of motion.      Cervical back: Normal range of motion and neck supple.      Right lower leg: No edema.      Left lower leg: No edema.   Skin:     General: Skin is warm and dry.      Findings: No bruising, lesion or rash.   Neurological:      General: No focal deficit present.      Mental Status: She is alert and oriented to person, place, and time. Mental status is at baseline.      Motor: No weakness.      Gait: Gait normal.   Psychiatric:         Mood and Affect: Mood normal.         Behavior: Behavior normal.         Thought Content: Thought content normal.         Judgment: Judgment normal.        Investigations:      Laboratory Testing:  No results found for this or any previous visit (from the past 24 hours).    No results for input(s): \"HGB\", \"HCT\", \"WBC\", \"MCV\", \"PLATELET\", \"NA\", \"K\", \"CL\", \"CO2\", \"BUN\", \"CREATININE\", \"GLUCOSE\", \"INR\", \"PROTIME\", \"APTT\", \"AST\", \"ALT\", \"LABALBU\", \"HCG\" in the last 720 hours.    No results for input(s): \"COVID19\" in the last 720 hours.  Imaging/Diagnostics:    [facility]EDA DIGITAL SCREEN BILATERAL  Result Date: 3/31/2025  EXAMINATION: SCREENING DIGITAL BILATERAL MAMMOGRAM WITH TOMOSYNTHESIS, 3/29/2025 TECHNIQUE: Screening mammography was performed with tomosynthesis including MLO and CC views of the bilateral breasts. Computer aided detection was used for the interpretation of this exam. COMPARISON: 2 July 2020; 3 November 2018 HISTORY: Screening. Positive family history of breast cancer; grandmother and aunt questionable.  No HRT therapy or breast interventions.  TC score 17.61 FINDINGS: BREAST COMPOSITION: There are scattered areas of fibroglandular density. Bilateral breasts are composed of scattered fibroglandular density.  No skin thickening, nipple contour changes, suspicious calcifications, suspicious masses, areas of architectural distortion or significant interval changes are noted.     No evidence of malignancy seen

## 2025-04-25 NOTE — OP NOTE
Operative Note      Patient: Kusum Carroll  YOB: 1970  MRN: [id number]    Date of Procedure: 4/25/2025    Pre-Op Diagnosis Codes:      * Screen for colon cancer [Z12.11]    Post-Op Diagnosis:  Cecal polyp, sigmoid diverticulosis internal hemorrhoids       Procedure(s):  COLONOSCOPY POLYPECTOMY SNARE/BIOPSY    Surgeon(s):  Bob Levy MD    Assistant:   * No surgical staff found *    Anesthesia: Monitor Anesthesia Care    Estimated Blood Loss (mL): Minimal    Complications: None    Specimens:   ID Type Source Tests Collected by Time Destination   A : cecal polyp Tissue Cecum SURGICAL PATHOLOGY Bob Levy MD 4/25/2025 0948        Implants:  * No implants in log *      Drains: * No LDAs found *    Findings:  Infection Present At Time Of Surgery (PATOS) (choose all levels that have infection present):  No infection present      Scope withdrawal time: 12 min     Description of Procedure:  Informed consent was obtained from the patient after explanation of the procedure including indications, description of the procedure,  benefits and possible risks and complications of the procedure, and alternatives. Questions were answered.  The patient's history was reviewed and a directed physical examination was performed prior to the procedure.    Patient was monitored throughout the procedure with pulse oximetry and periodic assessment of vital signs. Patient was sedated as noted above. With the patient initially in the left lateral decubitus position, a digital rectal examination was performed and revealed negative without mass, lesions or tenderness.  The Olympus video colonoscope was placed in the patient's rectum and advanced without difficulty  to the cecum, which was identified by the ileocecal valve and appendiceal orifice.  The prep was good.  Examination of the mucosa was performed during both introduction and withdrawal of the colonoscope. Retroflexed view of the rectum was

## 2025-04-25 NOTE — ANESTHESIA PRE PROCEDURE
(200 lb 4.8 oz)   Height:  1.664 m (5' 5.5\")                                              BP Readings from Last 3 Encounters:   04/25/25 137/83   03/18/25 118/68   01/02/25 116/64       NPO Status: Time of last liquid consumption: 2300                        Time of last solid consumption: 2300                        Date of last liquid consumption: 04/24/25                        Date of last solid food consumption: 04/23/25    BMI:   Wt Readings from Last 3 Encounters:   04/25/25 90.9 kg (200 lb 4.8 oz)   03/18/25 92.2 kg (203 lb 3.2 oz)   01/02/25 89.2 kg (196 lb 9.6 oz)     Body mass index is 32.82 kg/m².    CBC:   Lab Results   Component Value Date/Time    WBC 5.5 01/06/2025 01:15 PM    RBC 4.74 01/06/2025 01:15 PM    HGB 13.8 01/06/2025 01:15 PM    HCT 43.0 01/06/2025 01:15 PM    MCV 90.7 01/06/2025 01:15 PM    RDW 12.8 01/06/2025 01:15 PM     01/06/2025 01:15 PM       CMP:   Lab Results   Component Value Date/Time     01/06/2025 01:15 PM    K 4.5 01/06/2025 01:15 PM     01/06/2025 01:15 PM    CO2 28 01/06/2025 01:15 PM    BUN 15 01/06/2025 01:15 PM    CREATININE 0.9 01/06/2025 01:15 PM    GFRAA >60 09/03/2021 01:32 PM    LABGLOM 75 01/06/2025 01:15 PM    GLUCOSE 89 01/06/2025 01:15 PM    CALCIUM 9.8 01/06/2025 01:15 PM    BILITOT 0.4 01/06/2025 01:15 PM    ALKPHOS 71 01/06/2025 01:15 PM    AST 24 01/06/2025 01:15 PM    ALT 23 01/06/2025 01:15 PM       POC Tests: No results for input(s): \"POCGLU\", \"POCNA\", \"POCK\", \"POCCL\", \"POCBUN\", \"POCHEMO\", \"POCHCT\" in the last 72 hours.    Coags:   Lab Results   Component Value Date/Time    PROTIME 10.1 09/03/2021 01:32 PM    INR 0.9 09/03/2021 01:32 PM    APTT 23.0 09/03/2021 01:32 PM       HCG (If Applicable):   Lab Results   Component Value Date    PREGTESTUR negative 10/05/2019        ABGs: No results found for: \"PHART\", \"PO2ART\", \"WFY2PVI\", \"TUN8BWQ\", \"BEART\", \"X4DRTWKA\"     Type & Screen (If Applicable):  No results found for: \"ABORH\",

## 2025-04-25 NOTE — ANESTHESIA POSTPROCEDURE EVALUATION
Department of Anesthesiology  Postprocedure Note    Patient: Kusum Carroll  MRN: [id number]  YOB: 1970  Date of evaluation: 4/25/2025    Procedure Summary       Date: 04/25/25 Room / Location: [address]    Anesthesia Start: 0937 Anesthesia Stop: 1008    Procedure: COLONOSCOPY POLYPECTOMY SNARE/BIOPSY Diagnosis:       Screen for colon cancer      (Screen for colon cancer [Z12.11])    Surgeons: Bob Levy MD Responsible Provider: Ney Hernadez MD    Anesthesia Type: MAC ASA Status: 2            Anesthesia Type: No value filed.    Abdirizak Phase I: Abdirizak Score: 10    Abdirizak Phase II: Abdirizak Score: 10    Anesthesia Post Evaluation    Patient location during evaluation: PACU  Patient participation: complete - patient participated  Level of consciousness: awake  Airway patency: patent  Nausea & Vomiting: no nausea  Cardiovascular status: blood pressure returned to baseline  Respiratory status: acceptable  Hydration status: euvolemic  Comments: Multimodal analgesia pain management as indicated by procedure  Multimodal analgesia pain management approach  Pain management: adequate    No notable events documented.

## 2025-04-25 NOTE — DISCHARGE INSTRUCTIONS
[facility]    POST-ENDOSCOPY INSTRUCTIONS    1. ACTIVITY   No driving, operating machinery, or making important decisions for 24 hours.    Resume normal activity after 24 hours.  You may return to work after 24 hours.    2. DIET        Resume your usual diet unless specified below.   ***    3. MEDICATIONS    Resume your usual medications.     Do not consume alcohol, tranquilizers, or sleeping medications for 24 hour unless advised by your physician.                 4. PHYSICIAN FOLLOW-UP / INSTRUCTIONS    Please call the office/clinic in 10 days for biopsy results:      [  ] GI office:  [phone] option #2          Follow up with your family physician as planned.    6. NORMAL CHANGES YOU MAY EXPERIENCE AFTER ENDOSCOPY:       COLONOSCOPY    Passing of gas for several hours.       Some mild abdominal cramping.                   You may feel fatigued for the next 24-48   hours due to the prep and sedation    7. CALL YOUR PHYSICIAN IF YOU EXPERIENCE ANY OF THE FOLLOWING      A.  Passing blood rectally or vomiting blood (color may be red or black)      B.  Severe abdominal pain or tenderness (that is not relieved by passing air)      C.  Fever, chills, or excessive sweating      D.  Persistent nausea or vomiting      E.  Redness or swelling at the IV site    If you have additional questions, PLEASE call your doctor or the [facility] GI Unit ([phone] option #2)

## 2025-04-25 NOTE — DISCHARGE INSTR - ACTIVITY
POST-ENDOSCOPY INSTRUCTIONS    1. ACTIVITY   No driving, operating machinery, or making important decisions for 24 hours.    Resume normal activity after 24 hours.  You may return to work after 24 hours.    2. DIET        Resume your usual diet unless specified below.   ***    3. MEDICATIONS    Resume your usual medications.     Do not consume alcohol, tranquilizers, or sleeping medications for 24 hour unless advised by your physician.                 4. PHYSICIAN FOLLOW-UP / INSTRUCTIONS    Please call the office/clinic in 10 days for biopsy results:      [  ] GI office:  [phone] option #2          Follow up with your family physician as planned.    6. NORMAL CHANGES YOU MAY EXPERIENCE AFTER ENDOSCOPY:       COLONOSCOPY    Passing of gas for several hours.       Some mild abdominal cramping.                   You may feel fatigued for the next 24-48   hours due to the prep and sedation    7. CALL YOUR PHYSICIAN IF YOU EXPERIENCE ANY OF THE FOLLOWING      A.  Passing blood rectally or vomiting blood (color may be red or black)      B.  Severe abdominal pain or tenderness (that is not relieved by passing air)      C.  Fever, chills, or excessive sweating      D.  Persistent nausea or vomiting      E.  Redness or swelling at the IV site    If you have additional questions, PLEASE call your doctor or the [facility] GI Unit ([phone] option #2)

## 2025-04-28 ENCOUNTER — TELEPHONE (OUTPATIENT)
Dept: GASTROENTEROLOGY | Age: 55
End: 2025-04-28

## 2025-04-28 LAB — SURGICAL PATHOLOGY REPORT: NORMAL

## 2025-04-28 NOTE — TELEPHONE ENCOUNTER
----- Message from Dr. Bob Levy MD sent at 4/28/2025 12:03 PM EDT -----  Polyp removed at the time of your colonoscopy was consistent with tubular adenoma.  This is not a cancer.  Will be repeating your colonoscopy in 3 years.  Follow-up with your PCP.

## 2025-04-29 ENCOUNTER — TELEPHONE (OUTPATIENT)
Dept: GASTROENTEROLOGY | Age: 55
End: 2025-04-29

## 2025-04-29 NOTE — TELEPHONE ENCOUNTER
----- Message from Dr. Bob Levy MD sent at 4/25/2025 10:02 AM EDT -----  Repeat colonoscopy in 3 years    3 yr colon recall encountered

## 2025-05-01 PROBLEM — Z12.11 SCREEN FOR COLON CANCER: Status: RESOLVED | Noted: 2025-04-01 | Resolved: 2025-05-01

## (undated) DEVICE — STAZ ENDO KIT: Brand: MEDLINE INDUSTRIES, INC.

## (undated) DEVICE — TRAP SPEC POLYP REM STRNR CLN DSGN MAGNIFYING WIND DISP

## (undated) DEVICE — SINGLE-USE POLYPECTOMY SNARE: Brand: CAPTIFLEX